# Patient Record
Sex: FEMALE | Race: BLACK OR AFRICAN AMERICAN | URBAN - METROPOLITAN AREA
[De-identification: names, ages, dates, MRNs, and addresses within clinical notes are randomized per-mention and may not be internally consistent; named-entity substitution may affect disease eponyms.]

---

## 2021-08-06 ENCOUNTER — ANESTHESIA EVENT (OUTPATIENT)
Dept: SURGERY | Facility: HOSPITAL | Age: 36
End: 2021-08-06
Payer: MEDICAID

## 2021-08-06 ENCOUNTER — APPOINTMENT (OUTPATIENT)
Dept: GENERAL RADIOLOGY | Facility: HOSPITAL | Age: 36
End: 2021-08-06
Attending: EMERGENCY MEDICINE
Payer: MEDICAID

## 2021-08-06 ENCOUNTER — HOSPITAL ENCOUNTER (OUTPATIENT)
Facility: HOSPITAL | Age: 36
Setting detail: HOSPITAL OUTPATIENT SURGERY
Discharge: HOME | End: 2021-08-06
Attending: EMERGENCY MEDICINE | Admitting: COLON & RECTAL SURGERY
Payer: MEDICAID

## 2021-08-06 ENCOUNTER — ANESTHESIA (OUTPATIENT)
Dept: SURGERY | Facility: HOSPITAL | Age: 36
End: 2021-08-06
Payer: MEDICAID

## 2021-08-06 ENCOUNTER — APPOINTMENT (OUTPATIENT)
Dept: CT IMAGING | Facility: HOSPITAL | Age: 36
End: 2021-08-06
Attending: EMERGENCY MEDICINE
Payer: MEDICAID

## 2021-08-06 VITALS
WEIGHT: 280 LBS | HEART RATE: 106 BPM | DIASTOLIC BLOOD PRESSURE: 78 MMHG | TEMPERATURE: 98 F | OXYGEN SATURATION: 92 % | SYSTOLIC BLOOD PRESSURE: 144 MMHG | BODY MASS INDEX: 54.97 KG/M2 | HEIGHT: 60 IN | RESPIRATION RATE: 18 BRPM

## 2021-08-06 DIAGNOSIS — K37 APPENDICITIS: ICD-10-CM

## 2021-08-06 DIAGNOSIS — K35.30 ACUTE APPENDICITIS WITH LOCALIZED PERITONITIS, WITHOUT PERFORATION, ABSCESS, OR GANGRENE: Primary | ICD-10-CM

## 2021-08-06 PROBLEM — E87.1 HYPONATREMIA: Status: ACTIVE | Noted: 2021-08-06

## 2021-08-06 LAB
ALBUMIN SERPL-MCNC: 3.2 G/DL (ref 3.4–5)
ALBUMIN/GLOB SERPL: 0.6 {RATIO} (ref 1–2)
ALP LIVER SERPL-CCNC: 50 U/L
ALT SERPL-CCNC: 30 U/L
ANION GAP SERPL CALC-SCNC: 4 MMOL/L (ref 0–18)
AST SERPL-CCNC: 31 U/L (ref 15–37)
B-HCG UR QL: NEGATIVE
BASOPHILS # BLD AUTO: 0.03 X10(3) UL (ref 0–0.2)
BASOPHILS NFR BLD AUTO: 0.3 %
BILIRUB SERPL-MCNC: 0.6 MG/DL (ref 0.1–2)
BILIRUB UR QL STRIP.AUTO: NEGATIVE
BUN BLD-MCNC: 9 MG/DL (ref 7–18)
CALCIUM BLD-MCNC: 9.2 MG/DL (ref 8.5–10.1)
CHLORIDE SERPL-SCNC: 101 MMOL/L (ref 98–112)
CLARITY UR REFRACT.AUTO: CLEAR
CO2 SERPL-SCNC: 28 MMOL/L (ref 21–32)
CREAT BLD-MCNC: 0.87 MG/DL
EOSINOPHIL # BLD AUTO: 0.18 X10(3) UL (ref 0–0.7)
EOSINOPHIL NFR BLD AUTO: 1.7 %
ERYTHROCYTE [DISTWIDTH] IN BLOOD BY AUTOMATED COUNT: 14 %
GLOBULIN PLAS-MCNC: 5.4 G/DL (ref 2.8–4.4)
GLUCOSE BLD-MCNC: 96 MG/DL (ref 70–99)
GLUCOSE UR STRIP.AUTO-MCNC: NEGATIVE MG/DL
HCT VFR BLD AUTO: 40.3 %
HGB BLD-MCNC: 13.4 G/DL
IMM GRANULOCYTES # BLD AUTO: 0.03 X10(3) UL (ref 0–1)
IMM GRANULOCYTES NFR BLD: 0.3 %
KETONES UR STRIP.AUTO-MCNC: NEGATIVE MG/DL
LEUKOCYTE ESTERASE UR QL STRIP.AUTO: NEGATIVE
LYMPHOCYTES # BLD AUTO: 2.29 X10(3) UL (ref 1–4)
LYMPHOCYTES NFR BLD AUTO: 21.2 %
M PROTEIN MFR SERPL ELPH: 8.6 G/DL (ref 6.4–8.2)
MCH RBC QN AUTO: 29.5 PG (ref 26–34)
MCHC RBC AUTO-ENTMCNC: 33.3 G/DL (ref 31–37)
MCV RBC AUTO: 88.6 FL
MONOCYTES # BLD AUTO: 0.84 X10(3) UL (ref 0.1–1)
MONOCYTES NFR BLD AUTO: 7.8 %
NEUTROPHILS # BLD AUTO: 7.45 X10 (3) UL (ref 1.5–7.7)
NEUTROPHILS # BLD AUTO: 7.45 X10(3) UL (ref 1.5–7.7)
NEUTROPHILS NFR BLD AUTO: 68.7 %
NITRITE UR QL STRIP.AUTO: NEGATIVE
OSMOLALITY SERPL CALC.SUM OF ELEC: 275 MOSM/KG (ref 275–295)
PH UR STRIP.AUTO: 6 [PH] (ref 5–8)
PLATELET # BLD AUTO: 218 10(3)UL (ref 150–450)
POTASSIUM SERPL-SCNC: 4.2 MMOL/L (ref 3.5–5.1)
PROT UR STRIP.AUTO-MCNC: NEGATIVE MG/DL
RBC # BLD AUTO: 4.55 X10(6)UL
SARS-COV-2 RNA RESP QL NAA+PROBE: NOT DETECTED
SODIUM SERPL-SCNC: 133 MMOL/L (ref 136–145)
SP GR UR STRIP.AUTO: 1 (ref 1–1.03)
UROBILINOGEN UR STRIP.AUTO-MCNC: <2 MG/DL
WBC # BLD AUTO: 10.8 X10(3) UL (ref 4–11)

## 2021-08-06 PROCEDURE — 99285 EMERGENCY DEPT VISIT HI MDM: CPT

## 2021-08-06 PROCEDURE — 80053 COMPREHEN METABOLIC PANEL: CPT | Performed by: EMERGENCY MEDICINE

## 2021-08-06 PROCEDURE — 74177 CT ABD & PELVIS W/CONTRAST: CPT | Performed by: EMERGENCY MEDICINE

## 2021-08-06 PROCEDURE — 85025 COMPLETE CBC W/AUTO DIFF WBC: CPT | Performed by: EMERGENCY MEDICINE

## 2021-08-06 PROCEDURE — 96374 THER/PROPH/DIAG INJ IV PUSH: CPT

## 2021-08-06 PROCEDURE — 88304 TISSUE EXAM BY PATHOLOGIST: CPT | Performed by: STUDENT IN AN ORGANIZED HEALTH CARE EDUCATION/TRAINING PROGRAM

## 2021-08-06 PROCEDURE — 72220 X-RAY EXAM SACRUM TAILBONE: CPT | Performed by: EMERGENCY MEDICINE

## 2021-08-06 PROCEDURE — 81001 URINALYSIS AUTO W/SCOPE: CPT | Performed by: EMERGENCY MEDICINE

## 2021-08-06 PROCEDURE — 0DTJ4ZZ RESECTION OF APPENDIX, PERCUTANEOUS ENDOSCOPIC APPROACH: ICD-10-PCS | Performed by: STUDENT IN AN ORGANIZED HEALTH CARE EDUCATION/TRAINING PROGRAM

## 2021-08-06 PROCEDURE — 81025 URINE PREGNANCY TEST: CPT

## 2021-08-06 RX ORDER — LIDOCAINE HYDROCHLORIDE 10 MG/ML
INJECTION, SOLUTION EPIDURAL; INFILTRATION; INTRACAUDAL; PERINEURAL AS NEEDED
Status: DISCONTINUED | OUTPATIENT
Start: 2021-08-06 | End: 2021-08-06 | Stop reason: SURG

## 2021-08-06 RX ORDER — KETOROLAC TROMETHAMINE 30 MG/ML
15 INJECTION, SOLUTION INTRAMUSCULAR; INTRAVENOUS ONCE
Status: COMPLETED | OUTPATIENT
Start: 2021-08-06 | End: 2021-08-06

## 2021-08-06 RX ORDER — HYDROMORPHONE HYDROCHLORIDE 1 MG/ML
INJECTION, SOLUTION INTRAMUSCULAR; INTRAVENOUS; SUBCUTANEOUS
Status: COMPLETED
Start: 2021-08-06 | End: 2021-08-06

## 2021-08-06 RX ORDER — MIDAZOLAM HYDROCHLORIDE 1 MG/ML
1 INJECTION INTRAMUSCULAR; INTRAVENOUS EVERY 5 MIN PRN
Status: DISCONTINUED | OUTPATIENT
Start: 2021-08-06 | End: 2021-08-06

## 2021-08-06 RX ORDER — HYDROMORPHONE HYDROCHLORIDE 1 MG/ML
0.5 INJECTION, SOLUTION INTRAMUSCULAR; INTRAVENOUS; SUBCUTANEOUS EVERY 30 MIN PRN
Status: CANCELLED | OUTPATIENT
Start: 2021-08-06 | End: 2021-08-06

## 2021-08-06 RX ORDER — HYDROMORPHONE HYDROCHLORIDE 1 MG/ML
0.4 INJECTION, SOLUTION INTRAMUSCULAR; INTRAVENOUS; SUBCUTANEOUS EVERY 5 MIN PRN
Status: DISCONTINUED | OUTPATIENT
Start: 2021-08-06 | End: 2021-08-06

## 2021-08-06 RX ORDER — ONDANSETRON 2 MG/ML
4 INJECTION INTRAMUSCULAR; INTRAVENOUS EVERY 4 HOURS PRN
Status: CANCELLED | OUTPATIENT
Start: 2021-08-06 | End: 2021-08-06

## 2021-08-06 RX ORDER — SODIUM CHLORIDE, SODIUM LACTATE, POTASSIUM CHLORIDE, CALCIUM CHLORIDE 600; 310; 30; 20 MG/100ML; MG/100ML; MG/100ML; MG/100ML
INJECTION, SOLUTION INTRAVENOUS CONTINUOUS
Status: DISCONTINUED | OUTPATIENT
Start: 2021-08-06 | End: 2021-08-06

## 2021-08-06 RX ORDER — ROCURONIUM BROMIDE 10 MG/ML
INJECTION, SOLUTION INTRAVENOUS AS NEEDED
Status: DISCONTINUED | OUTPATIENT
Start: 2021-08-06 | End: 2021-08-06 | Stop reason: SURG

## 2021-08-06 RX ORDER — CEFOXITIN 2 G/1
INJECTION, POWDER, FOR SOLUTION INTRAVENOUS AS NEEDED
Status: DISCONTINUED | OUTPATIENT
Start: 2021-08-06 | End: 2021-08-06 | Stop reason: SURG

## 2021-08-06 RX ORDER — OXYCODONE HYDROCHLORIDE 5 MG/1
5 TABLET ORAL ONCE
Status: COMPLETED | OUTPATIENT
Start: 2021-08-06 | End: 2021-08-06

## 2021-08-06 RX ORDER — HYDROCHLOROTHIAZIDE 25 MG/1
25 TABLET ORAL DAILY
COMMUNITY

## 2021-08-06 RX ORDER — DEXAMETHASONE SODIUM PHOSPHATE 4 MG/ML
VIAL (ML) INJECTION AS NEEDED
Status: DISCONTINUED | OUTPATIENT
Start: 2021-08-06 | End: 2021-08-06 | Stop reason: SURG

## 2021-08-06 RX ORDER — AMOXICILLIN AND CLAVULANATE POTASSIUM 875; 125 MG/1; MG/1
1 TABLET, FILM COATED ORAL 2 TIMES DAILY
Qty: 10 TABLET | Refills: 0 | Status: SHIPPED | OUTPATIENT
Start: 2021-08-06 | End: 2021-08-11

## 2021-08-06 RX ORDER — KETOROLAC TROMETHAMINE 30 MG/ML
INJECTION, SOLUTION INTRAMUSCULAR; INTRAVENOUS AS NEEDED
Status: DISCONTINUED | OUTPATIENT
Start: 2021-08-06 | End: 2021-08-06 | Stop reason: SURG

## 2021-08-06 RX ORDER — SODIUM CHLORIDE 9 MG/ML
INJECTION, SOLUTION INTRAVENOUS CONTINUOUS
Status: CANCELLED | OUTPATIENT
Start: 2021-08-06 | End: 2021-08-06

## 2021-08-06 RX ORDER — AMLODIPINE BESYLATE 10 MG/1
10 TABLET ORAL DAILY
COMMUNITY

## 2021-08-06 RX ORDER — METOCLOPRAMIDE HYDROCHLORIDE 5 MG/ML
INJECTION INTRAMUSCULAR; INTRAVENOUS AS NEEDED
Status: DISCONTINUED | OUTPATIENT
Start: 2021-08-06 | End: 2021-08-06 | Stop reason: SURG

## 2021-08-06 RX ORDER — LABETALOL HYDROCHLORIDE 5 MG/ML
5 INJECTION, SOLUTION INTRAVENOUS EVERY 5 MIN PRN
Status: DISCONTINUED | OUTPATIENT
Start: 2021-08-06 | End: 2021-08-06

## 2021-08-06 RX ORDER — ONDANSETRON 2 MG/ML
4 INJECTION INTRAMUSCULAR; INTRAVENOUS AS NEEDED
Status: DISCONTINUED | OUTPATIENT
Start: 2021-08-06 | End: 2021-08-06

## 2021-08-06 RX ORDER — ONDANSETRON 2 MG/ML
INJECTION INTRAMUSCULAR; INTRAVENOUS AS NEEDED
Status: DISCONTINUED | OUTPATIENT
Start: 2021-08-06 | End: 2021-08-06 | Stop reason: SURG

## 2021-08-06 RX ORDER — BUPIVACAINE HYDROCHLORIDE AND EPINEPHRINE 2.5; 5 MG/ML; UG/ML
INJECTION, SOLUTION EPIDURAL; INFILTRATION; INTRACAUDAL; PERINEURAL AS NEEDED
Status: DISCONTINUED | OUTPATIENT
Start: 2021-08-06 | End: 2021-08-06 | Stop reason: HOSPADM

## 2021-08-06 RX ORDER — NALOXONE HYDROCHLORIDE 0.4 MG/ML
80 INJECTION, SOLUTION INTRAMUSCULAR; INTRAVENOUS; SUBCUTANEOUS AS NEEDED
Status: DISCONTINUED | OUTPATIENT
Start: 2021-08-06 | End: 2021-08-06

## 2021-08-06 RX ORDER — OXYCODONE HYDROCHLORIDE 5 MG/1
5 TABLET ORAL EVERY 6 HOURS PRN
Qty: 10 TABLET | Refills: 0 | Status: SHIPPED | OUTPATIENT
Start: 2021-08-06 | End: 2021-08-23

## 2021-08-06 RX ORDER — MEPERIDINE HYDROCHLORIDE 25 MG/ML
12.5 INJECTION INTRAMUSCULAR; INTRAVENOUS; SUBCUTANEOUS AS NEEDED
Status: DISCONTINUED | OUTPATIENT
Start: 2021-08-06 | End: 2021-08-06

## 2021-08-06 RX ORDER — HEPARIN SODIUM 5000 [USP'U]/ML
5000 INJECTION, SOLUTION INTRAVENOUS; SUBCUTANEOUS ONCE
Status: DISCONTINUED | OUTPATIENT
Start: 2021-08-06 | End: 2021-08-06

## 2021-08-06 RX ORDER — ACETAMINOPHEN 10 MG/ML
INJECTION, SOLUTION INTRAVENOUS AS NEEDED
Status: DISCONTINUED | OUTPATIENT
Start: 2021-08-06 | End: 2021-08-06 | Stop reason: SURG

## 2021-08-06 RX ORDER — SODIUM CHLORIDE 9 MG/ML
INJECTION, SOLUTION INTRAVENOUS CONTINUOUS PRN
Status: DISCONTINUED | OUTPATIENT
Start: 2021-08-06 | End: 2021-08-06 | Stop reason: SURG

## 2021-08-06 RX ADMIN — DEXAMETHASONE SODIUM PHOSPHATE 8 MG: 4 MG/ML VIAL (ML) INJECTION at 15:42:00

## 2021-08-06 RX ADMIN — ROCURONIUM BROMIDE 50 MG: 10 INJECTION, SOLUTION INTRAVENOUS at 15:40:00

## 2021-08-06 RX ADMIN — KETOROLAC TROMETHAMINE 30 MG: 30 INJECTION, SOLUTION INTRAMUSCULAR; INTRAVENOUS at 16:11:00

## 2021-08-06 RX ADMIN — SODIUM CHLORIDE: 9 INJECTION, SOLUTION INTRAVENOUS at 15:27:00

## 2021-08-06 RX ADMIN — ACETAMINOPHEN 1000 MG: 10 INJECTION, SOLUTION INTRAVENOUS at 16:08:00

## 2021-08-06 RX ADMIN — LIDOCAINE HYDROCHLORIDE 50 MG: 10 INJECTION, SOLUTION EPIDURAL; INFILTRATION; INTRACAUDAL; PERINEURAL at 15:32:00

## 2021-08-06 RX ADMIN — ONDANSETRON 4 MG: 2 INJECTION INTRAMUSCULAR; INTRAVENOUS at 16:11:00

## 2021-08-06 RX ADMIN — METOCLOPRAMIDE HYDROCHLORIDE 10 MG: 5 INJECTION INTRAMUSCULAR; INTRAVENOUS at 15:42:00

## 2021-08-06 RX ADMIN — CEFOXITIN 3 G: 2 INJECTION, POWDER, FOR SOLUTION INTRAVENOUS at 15:37:00

## 2021-08-06 NOTE — OPERATIVE REPORT
General Surgery Operative Note  Ben Ingram Location: OR   CSN [id number] MRN [id number]    1985 Age 29year old   Admission Date 2021 Operation Date 2021   Attending Physician Mel Johnson MD Operating Physician Minnie Ponce DESCRIPTION OF PROCEDURE:   After confirmation of informed consent, the patient was transferred to the operating room under the care of the surgical anesthesia team and placed in the supine position. Left arm was tucked and anesthesia was induced.   A Fo transected. The appendix was then ligating using Endo SHAYY stapler with a blue load staples. The appendix was placed into an Endo Catch bag and removed from the abdomen and sent off the sterile field pending final pathology.   The abdomen was then inspecte

## 2021-08-06 NOTE — ANESTHESIA POSTPROCEDURE EVALUATION
Brittany Patient Status:  Inpatient   Age/Gender 29year old female MRN [id number]   [facility] SURGERY Attending Quoc Rao MD   Hosp Day # 0 PCP JENNIFER DURON       Anesthesia Post-op Note    LAPAROSCOPIC

## 2021-08-06 NOTE — H&P
[facility]  Report of  Surgical Consultation with History and Physical Exam    Eros Curtis Patient Status:  Emergency    1985 MRN [id number]   Location [address] [facility] Attending No att. providers found   [facility] Day # 0 PCP facility-administered medications for this encounter. Review of Systems:    Allergic/Immuno:  Review of patient's allergies performed.   Cardiovascular:  Negative for cool extremity and irregular heartbeat/palpitations  Constitutional:  Negative for decr reducible umbilical hernia. Extremities:  No lower extremity edema noted. Without clubbing or cyanosis. Skin: Normal texture and turgor.       Laboratory Data and Relevant X-rays:  Recent Labs   Lab 08/06/21  1130   RBC 4.55   HGB 13.4   HCT 40.3 Problem List:     Hyponatremia     Acute appendicitis with localized peritonitis, without perforation, abscess, or gangrene      Pooja Brown is a 36-year-old female with a history of obesity and hypertension who presented to the emergency department with Small reducible umbilical hernia, transverse Pfannenstiel incision well-healed   Extrem: No C/C/E        A/P  29year old female with acute uncomplicated appendicitis     5. Agree with above, plan for laparoscopic appendectomy possible open  6.  Discussed

## 2021-08-06 NOTE — ANESTHESIA PROCEDURE NOTES
Airway  Date/Time: 8/6/2021 3:34 PM  Urgency: elective      General Information and Staff    Patient location during procedure: OR  Anesthesiologist: Josh Singletary MD  Resident/CRNA: Yarely Wells CRNA  Performed: CRNA     Indications and Patient Conditio

## 2021-08-06 NOTE — ED INITIAL ASSESSMENT (HPI)
Patient presents with c/o right pelvic pain for about 24 hours. Pain radiates towards the middle of her pelvis at times. No urinary symptoms.  She also reports pain in her tailbone which has been ongoing since a fall while roller skating several months back

## 2021-08-06 NOTE — ANESTHESIA PREPROCEDURE EVALUATION
PRE-OP EVALUATION    Patient Name: Jaiden Romero    Admit Diagnosis: No admission diagnoses are documented for this encounter.     Pre-op Diagnosis: Appendicitis [K37]    LAPAROSCOPIC APPENDECTOMY    Anesthesia Procedure: LAPAROSCOPIC APPENDECTOMY (N/A Abdo Patient admitted to room 227 from ED. Patient oriented to room, call light, bed rails, phone, lights and CHG bathed. 4 eyed skin assessment done. Bed locked, in lowest position, side rails up 2/4, call light within reach. Will continue to monitor. Date     (L) 08/06/2021    K 4.2 08/06/2021     08/06/2021    CO2 28.0 08/06/2021    BUN 9 08/06/2021    CREATSERUM 0.87 08/06/2021    GLU 96 08/06/2021    CA 9.2 08/06/2021            Airway      Mallampati: II  Mouth opening: >3 FB  TM distan

## 2021-08-06 NOTE — ED PROVIDER NOTES
Patient Seen in: [facility] Emergency Department      History   Patient presents with:  Abdomen/Flank Pain    Stated Complaint: abdominal pain and tail bone pain following fall    HPI/Subjective:   HPI    36-year-old female who presents to the emerg rhonchi, or rales appreciated. No accessory muscle use noted for breathing. Cardiac: Regular rate and rhythm.   Normal S1 and 2 without murmurs or ectopy appreciated  Abdomen: Soft on examination with mild discomfort noted in the lower pelvis and right lo FINDINGS:     BONES:  Normal.  No significant arthropathy or acute abnormality. SOFT TISSUES:  Negative.  No visible soft tissue swelling. EFFUSION:   None visible.    OTHER:  Negative.                       Impression  CONCLUSION:  Negative exam.  No nondistended.  Calcified phleboliths within the pelvis.  No free pelvic fluid.     AORTA/VASCULAR: Cheryn Clutter is normal in caliber. BONES:  No acute fractures.                       Impression  CONCLUSION:     1.  Acute appendicitis without evidence of absces Prescribed:  Current Discharge Medication List

## 2021-08-10 ENCOUNTER — TELEPHONE (OUTPATIENT)
Dept: SURGERY | Facility: CLINIC | Age: 36
End: 2021-08-10

## 2021-08-10 RX ORDER — OXYCODONE HYDROCHLORIDE 5 MG/1
5 TABLET ORAL EVERY 8 HOURS PRN
Qty: 5 TABLET | Refills: 0 | Status: SHIPPED | OUTPATIENT
Start: 2021-08-10

## 2021-08-16 ENCOUNTER — OFFICE VISIT (OUTPATIENT)
Dept: SURGERY | Facility: CLINIC | Age: 36
End: 2021-08-16
Payer: MEDICAID

## 2021-08-16 VITALS
BODY MASS INDEX: 54.97 KG/M2 | TEMPERATURE: 99 F | HEIGHT: 60 IN | WEIGHT: 280 LBS | SYSTOLIC BLOOD PRESSURE: 132 MMHG | HEART RATE: 75 BPM | DIASTOLIC BLOOD PRESSURE: 96 MMHG

## 2021-08-16 DIAGNOSIS — Z48.89 ENCOUNTER FOR POST SURGICAL WOUND CHECK: ICD-10-CM

## 2021-08-16 DIAGNOSIS — Z09 POSTOP CHECK: Primary | ICD-10-CM

## 2021-08-16 PROBLEM — E87.1 HYPONATREMIA: Status: RESOLVED | Noted: 2021-08-06 | Resolved: 2021-08-16

## 2021-08-16 PROCEDURE — 99024 POSTOP FOLLOW-UP VISIT: CPT | Performed by: STUDENT IN AN ORGANIZED HEALTH CARE EDUCATION/TRAINING PROGRAM

## 2021-08-16 PROCEDURE — 3075F SYST BP GE 130 - 139MM HG: CPT | Performed by: STUDENT IN AN ORGANIZED HEALTH CARE EDUCATION/TRAINING PROGRAM

## 2021-08-16 PROCEDURE — 3080F DIAST BP >= 90 MM HG: CPT | Performed by: STUDENT IN AN ORGANIZED HEALTH CARE EDUCATION/TRAINING PROGRAM

## 2021-08-16 PROCEDURE — 3008F BODY MASS INDEX DOCD: CPT | Performed by: STUDENT IN AN ORGANIZED HEALTH CARE EDUCATION/TRAINING PROGRAM

## 2021-08-16 NOTE — PATIENT INSTRUCTIONS
May take Tylenol or ibuprofen as needed for pain  May remove Steri-Strips and begin water submersion at 2-week post surgery  Continue walking as much as tolerated  No running, jumping, heavy lifting until 6 weeks after surgery  May return to work as tolera

## 2021-08-16 NOTE — PROGRESS NOTES
Post Operative Visit Note       Active Problems  1. Postop check    2. Encounter for post surgical wound check         Chief Complaint   Patient presents with:  Appendix Problem: PO - 8/6 LAPAROSCOPIC APPENDECTOMY (req.  Dr. Tripathi) -- Pt states she is still use: Never    Other Topics      Concerns:       Current Outpatient Medications:   •  hydrochlorothiazide 25 MG Oral Tab, Take 25 mg by mouth daily. , Disp: , Rfl:   •  amLODIPine besylate 10 MG Oral Tab, Take 10 mg by mouth daily. , Disp: , Rfl:   •  oxyCODO to improve.     Dr. Luis GTZ) [facility] General Surgery  8/16/2021  10:21 AM

## 2021-08-23 ENCOUNTER — OFFICE VISIT (OUTPATIENT)
Dept: SURGERY | Facility: CLINIC | Age: 36
End: 2021-08-23
Payer: MEDICAID

## 2021-08-23 VITALS — DIASTOLIC BLOOD PRESSURE: 86 MMHG | HEART RATE: 82 BPM | TEMPERATURE: 98 F | SYSTOLIC BLOOD PRESSURE: 140 MMHG

## 2021-08-23 DIAGNOSIS — Z48.89 ENCOUNTER FOR POST SURGICAL WOUND CHECK: Primary | ICD-10-CM

## 2021-08-23 PROCEDURE — 3077F SYST BP >= 140 MM HG: CPT | Performed by: STUDENT IN AN ORGANIZED HEALTH CARE EDUCATION/TRAINING PROGRAM

## 2021-08-23 PROCEDURE — 3079F DIAST BP 80-89 MM HG: CPT | Performed by: STUDENT IN AN ORGANIZED HEALTH CARE EDUCATION/TRAINING PROGRAM

## 2021-08-23 PROCEDURE — 99024 POSTOP FOLLOW-UP VISIT: CPT | Performed by: STUDENT IN AN ORGANIZED HEALTH CARE EDUCATION/TRAINING PROGRAM

## 2021-08-23 NOTE — PROGRESS NOTES
Post Operative Visit Note       Active Problems  1. Encounter for post surgical wound check         Chief Complaint   Patient presents with:  Appendix Problem: 8/6 lap appey - States went back to work and thinks pt \"over did it\". Pain about 7/10.  States mouth daily. , Disp: , Rfl:   •  oxyCODONE 5 MG Oral Tab, Take 1 tablet (5 mg total) by mouth every 8 (eight) hours as needed for Pain.  (Patient not taking: Reported on 8/16/2021 ), Disp: 5 tablet, Rfl: 0      Review of Systems  A 10 point Review of Systems

## 2023-01-24 ENCOUNTER — HOSPITAL ENCOUNTER (EMERGENCY)
Facility: HOSPITAL | Age: 38
Discharge: HOME | End: 2023-01-25
Attending: EMERGENCY MEDICINE
Payer: MEDICAID

## 2023-01-24 ENCOUNTER — APPOINTMENT (OUTPATIENT)
Dept: GENERAL RADIOLOGY | Facility: HOSPITAL | Age: 38
End: 2023-01-24
Payer: MEDICAID

## 2023-01-24 DIAGNOSIS — J98.01 BRONCHOSPASM: ICD-10-CM

## 2023-01-24 DIAGNOSIS — J20.8 VIRAL BRONCHITIS: Primary | ICD-10-CM

## 2023-01-24 DIAGNOSIS — R06.02 SHORTNESS OF BREATH: ICD-10-CM

## 2023-01-24 LAB
ALBUMIN SERPL-MCNC: 3.3 G/DL (ref 3.4–5)
ALBUMIN/GLOB SERPL: 0.8 {RATIO} (ref 1–2)
ALP LIVER SERPL-CCNC: 44 U/L
ALT SERPL-CCNC: 29 U/L
ANION GAP SERPL CALC-SCNC: 6 MMOL/L (ref 0–18)
AST SERPL-CCNC: 21 U/L (ref 15–37)
BASOPHILS # BLD AUTO: 0.02 X10(3) UL (ref 0–0.2)
BASOPHILS NFR BLD AUTO: 0.2 %
BILIRUB SERPL-MCNC: 0.3 MG/DL (ref 0.1–2)
BUN BLD-MCNC: 13 MG/DL (ref 7–18)
CALCIUM BLD-MCNC: 9.2 MG/DL (ref 8.5–10.1)
CHLORIDE SERPL-SCNC: 105 MMOL/L (ref 98–112)
CO2 SERPL-SCNC: 26 MMOL/L (ref 21–32)
CREAT BLD-MCNC: 0.83 MG/DL
EOSINOPHIL # BLD AUTO: 0.11 X10(3) UL (ref 0–0.7)
EOSINOPHIL NFR BLD AUTO: 1.3 %
ERYTHROCYTE [DISTWIDTH] IN BLOOD BY AUTOMATED COUNT: 14.2 %
GFR SERPLBLD BASED ON 1.73 SQ M-ARVRAT: 94 ML/MIN/1.73M2 (ref 60–?)
GLOBULIN PLAS-MCNC: 4.1 G/DL (ref 2.8–4.4)
GLUCOSE BLD-MCNC: 93 MG/DL (ref 70–99)
HCT VFR BLD AUTO: 37.5 %
HGB BLD-MCNC: 12 G/DL
IMM GRANULOCYTES # BLD AUTO: 0.03 X10(3) UL (ref 0–1)
IMM GRANULOCYTES NFR BLD: 0.3 %
LYMPHOCYTES # BLD AUTO: 3.2 X10(3) UL (ref 1–4)
LYMPHOCYTES NFR BLD AUTO: 36.8 %
MCH RBC QN AUTO: 28.2 PG (ref 26–34)
MCHC RBC AUTO-ENTMCNC: 32 G/DL (ref 31–37)
MCV RBC AUTO: 88 FL
MONOCYTES # BLD AUTO: 0.73 X10(3) UL (ref 0.1–1)
MONOCYTES NFR BLD AUTO: 8.4 %
NEUTROPHILS # BLD AUTO: 4.6 X10 (3) UL (ref 1.5–7.7)
NEUTROPHILS # BLD AUTO: 4.6 X10(3) UL (ref 1.5–7.7)
NEUTROPHILS NFR BLD AUTO: 53 %
OSMOLALITY SERPL CALC.SUM OF ELEC: 284 MOSM/KG (ref 275–295)
PLATELET # BLD AUTO: 271 10(3)UL (ref 150–450)
POTASSIUM SERPL-SCNC: 3.5 MMOL/L (ref 3.5–5.1)
PROT SERPL-MCNC: 7.4 G/DL (ref 6.4–8.2)
RBC # BLD AUTO: 4.26 X10(6)UL
SODIUM SERPL-SCNC: 137 MMOL/L (ref 136–145)
TROPONIN I HIGH SENSITIVITY: 25 NG/L
WBC # BLD AUTO: 8.7 X10(3) UL (ref 4–11)

## 2023-01-24 PROCEDURE — 84484 ASSAY OF TROPONIN QUANT: CPT

## 2023-01-24 PROCEDURE — 85025 COMPLETE CBC W/AUTO DIFF WBC: CPT

## 2023-01-24 PROCEDURE — 93010 ELECTROCARDIOGRAM REPORT: CPT

## 2023-01-24 PROCEDURE — 80053 COMPREHEN METABOLIC PANEL: CPT | Performed by: EMERGENCY MEDICINE

## 2023-01-24 PROCEDURE — 80053 COMPREHEN METABOLIC PANEL: CPT

## 2023-01-24 PROCEDURE — 71045 X-RAY EXAM CHEST 1 VIEW: CPT

## 2023-01-24 PROCEDURE — 36415 COLL VENOUS BLD VENIPUNCTURE: CPT

## 2023-01-24 PROCEDURE — 85025 COMPLETE CBC W/AUTO DIFF WBC: CPT | Performed by: EMERGENCY MEDICINE

## 2023-01-24 PROCEDURE — 84484 ASSAY OF TROPONIN QUANT: CPT | Performed by: EMERGENCY MEDICINE

## 2023-01-24 PROCEDURE — 99285 EMERGENCY DEPT VISIT HI MDM: CPT

## 2023-01-24 PROCEDURE — 99284 EMERGENCY DEPT VISIT MOD MDM: CPT

## 2023-01-24 PROCEDURE — 93005 ELECTROCARDIOGRAM TRACING: CPT

## 2023-01-25 VITALS
WEIGHT: 293 LBS | BODY MASS INDEX: 57.52 KG/M2 | OXYGEN SATURATION: 97 % | HEART RATE: 77 BPM | TEMPERATURE: 98 F | DIASTOLIC BLOOD PRESSURE: 84 MMHG | HEIGHT: 60 IN | RESPIRATION RATE: 20 BRPM | SYSTOLIC BLOOD PRESSURE: 136 MMHG

## 2023-01-25 LAB
ATRIAL RATE: 90 BPM
B-HCG UR QL: NEGATIVE
FLUAV + FLUBV RNA SPEC NAA+PROBE: NEGATIVE
FLUAV + FLUBV RNA SPEC NAA+PROBE: NEGATIVE
P AXIS: 66 DEGREES
P-R INTERVAL: 200 MS
Q-T INTERVAL: 366 MS
QRS DURATION: 92 MS
QTC CALCULATION (BEZET): 447 MS
R AXIS: 43 DEGREES
RSV RNA SPEC NAA+PROBE: NEGATIVE
SARS-COV-2 RNA RESP QL NAA+PROBE: NOT DETECTED
T AXIS: 68 DEGREES
VENTRICULAR RATE: 90 BPM

## 2023-01-25 PROCEDURE — 0241U SARS-COV-2/FLU A AND B/RSV BY PCR (GENEXPERT): CPT | Performed by: EMERGENCY MEDICINE

## 2023-01-25 PROCEDURE — 81025 URINE PREGNANCY TEST: CPT

## 2023-01-25 RX ORDER — PREDNISONE 50 MG/1
50 TABLET ORAL DAILY
Qty: 5 TABLET | Refills: 0 | Status: SHIPPED | OUTPATIENT
Start: 2023-01-25

## 2023-01-25 RX ORDER — PREDNISONE 20 MG/1
60 TABLET ORAL ONCE
Status: COMPLETED | OUTPATIENT
Start: 2023-01-25 | End: 2023-01-25

## 2023-01-25 RX ORDER — ALBUTEROL SULFATE 90 UG/1
2 AEROSOL, METERED RESPIRATORY (INHALATION) EVERY 4 HOURS PRN
Qty: 1 EACH | Refills: 0 | Status: SHIPPED | OUTPATIENT
Start: 2023-01-25 | End: 2023-02-24

## 2023-01-25 RX ORDER — BENZONATATE 200 MG/1
200 CAPSULE ORAL 3 TIMES DAILY PRN
Qty: 30 CAPSULE | Refills: 0 | Status: SHIPPED | OUTPATIENT
Start: 2023-01-25 | End: 2023-02-24

## 2023-01-25 RX ORDER — CODEINE PHOSPHATE AND GUAIFENESIN 10; 100 MG/5ML; MG/5ML
10 SOLUTION ORAL EVERY 6 HOURS PRN
Qty: 200 ML | Refills: 0 | Status: SHIPPED | OUTPATIENT
Start: 2023-01-25

## 2023-05-02 ENCOUNTER — APPOINTMENT (OUTPATIENT)
Dept: GENERAL RADIOLOGY | Facility: HOSPITAL | Age: 38
End: 2023-05-02
Attending: EMERGENCY MEDICINE
Payer: COMMERCIAL

## 2023-05-02 ENCOUNTER — HOSPITAL ENCOUNTER (EMERGENCY)
Facility: HOSPITAL | Age: 38
Discharge: HOME | End: 2023-05-03
Attending: EMERGENCY MEDICINE
Payer: COMMERCIAL

## 2023-05-02 DIAGNOSIS — S20.219A CONTUSION OF CHEST WALL, UNSPECIFIED LATERALITY, INITIAL ENCOUNTER: Primary | ICD-10-CM
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
## 2023-05-02 PROCEDURE — 71046 X-RAY EXAM CHEST 2 VIEWS: CPT | Performed by: EMERGENCY MEDICINE

## 2023-05-02 PROCEDURE — 93005 ELECTROCARDIOGRAM TRACING: CPT

## 2023-05-02 PROCEDURE — 99284 EMERGENCY DEPT VISIT MOD MDM: CPT

## 2023-05-02 PROCEDURE — 93010 ELECTROCARDIOGRAM REPORT: CPT

## 2023-05-02 PROCEDURE — 99283 EMERGENCY DEPT VISIT LOW MDM: CPT

## 2023-05-03 VITALS
RESPIRATION RATE: 23 BRPM | OXYGEN SATURATION: 96 % | TEMPERATURE: 99 F | HEART RATE: 89 BPM | DIASTOLIC BLOOD PRESSURE: 93 MMHG | SYSTOLIC BLOOD PRESSURE: 150 MMHG

## 2023-05-03 LAB
ATRIAL RATE: 74 BPM
P AXIS: 55 DEGREES
P-R INTERVAL: 204 MS
Q-T INTERVAL: 408 MS
QRS DURATION: 92 MS
QTC CALCULATION (BEZET): 452 MS
R AXIS: 30 DEGREES
T AXIS: 45 DEGREES
VENTRICULAR RATE: 74 BPM
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
## 2023-12-23 ENCOUNTER — APPOINTMENT (OUTPATIENT)
Dept: CT IMAGING | Facility: HOSPITAL | Age: 38
End: 2023-12-23
Attending: EMERGENCY MEDICINE
Payer: COMMERCIAL

## 2023-12-23 ENCOUNTER — HOSPITAL ENCOUNTER (EMERGENCY)
Facility: HOSPITAL | Age: 38
Discharge: HOME | End: 2023-12-23
Attending: EMERGENCY MEDICINE
Payer: COMMERCIAL

## 2023-12-23 ENCOUNTER — APPOINTMENT (OUTPATIENT)
Dept: GENERAL RADIOLOGY | Facility: HOSPITAL | Age: 38
End: 2023-12-23
Attending: EMERGENCY MEDICINE
Payer: COMMERCIAL

## 2023-12-23 VITALS
TEMPERATURE: 99 F | DIASTOLIC BLOOD PRESSURE: 87 MMHG | SYSTOLIC BLOOD PRESSURE: 168 MMHG | RESPIRATION RATE: 26 BRPM | WEIGHT: 293 LBS | HEIGHT: 62 IN | OXYGEN SATURATION: 94 % | HEART RATE: 78 BPM | BODY MASS INDEX: 53.92 KG/M2

## 2023-12-23 DIAGNOSIS — S39.012A STRAIN OF LUMBAR REGION, INITIAL ENCOUNTER: ICD-10-CM

## 2023-12-23 DIAGNOSIS — S16.1XXA STRAIN OF NECK MUSCLE, INITIAL ENCOUNTER: Primary | ICD-10-CM
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
## 2023-12-23 PROCEDURE — 72110 X-RAY EXAM L-2 SPINE 4/>VWS: CPT | Performed by: EMERGENCY MEDICINE

## 2023-12-23 PROCEDURE — 99284 EMERGENCY DEPT VISIT MOD MDM: CPT

## 2023-12-23 PROCEDURE — 71111 X-RAY EXAM RIBS/CHEST4/> VWS: CPT | Performed by: EMERGENCY MEDICINE

## 2023-12-23 PROCEDURE — 72125 CT NECK SPINE W/O DYE: CPT | Performed by: EMERGENCY MEDICINE

## 2023-12-23 PROCEDURE — 70450 CT HEAD/BRAIN W/O DYE: CPT | Performed by: EMERGENCY MEDICINE

## 2023-12-23 RX ORDER — AMLODIPINE BESYLATE 5 MG/1
10 TABLET ORAL ONCE
Status: COMPLETED | OUTPATIENT
Start: 2023-12-23 | End: 2023-12-23

## 2023-12-23 RX ORDER — IBUPROFEN 600 MG/1
600 TABLET ORAL ONCE
Status: COMPLETED | OUTPATIENT
Start: 2023-12-23 | End: 2023-12-23

## 2023-12-23 RX ORDER — CYCLOBENZAPRINE HCL 10 MG
10 TABLET ORAL ONCE
Status: COMPLETED | OUTPATIENT
Start: 2023-12-23 | End: 2023-12-23

## 2023-12-23 RX ORDER — ACETAMINOPHEN 500 MG
1000 TABLET ORAL ONCE
Status: COMPLETED | OUTPATIENT
Start: 2023-12-23 | End: 2023-12-23

## 2023-12-23 RX ORDER — AMLODIPINE BESYLATE 5 MG/1
10 TABLET ORAL ONCE
Status: DISCONTINUED | OUTPATIENT
Start: 2023-12-24 | End: 2023-12-23

## 2023-12-23 RX ORDER — CYCLOBENZAPRINE HCL 10 MG
10 TABLET ORAL 3 TIMES DAILY PRN
Qty: 10 TABLET | Refills: 0 | Status: SHIPPED | OUTPATIENT
Start: 2023-12-23 | End: 2023-12-30
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
## 2023-12-24 NOTE — DISCHARGE INSTRUCTIONS
Follow-up with your primary care doctor within the next week for reassessment. Take the medication as prescribed. It can make you drowsy, so be careful when taking it. Return for any new or worsening pain or other concerning symptoms.

## 2024-07-04 ENCOUNTER — APPOINTMENT (OUTPATIENT)
Dept: ULTRASOUND IMAGING | Facility: HOSPITAL | Age: 39
End: 2024-07-04
Attending: EMERGENCY MEDICINE
Payer: MEDICAID

## 2024-07-04 ENCOUNTER — HOSPITAL ENCOUNTER (INPATIENT)
Facility: HOSPITAL | Age: 39
LOS: 2 days | Discharge: HOME | End: 2024-07-06
Attending: EMERGENCY MEDICINE | Admitting: HOSPITALIST
Payer: MEDICAID

## 2024-07-04 DIAGNOSIS — K81.9 CHOLECYSTITIS: ICD-10-CM

## 2024-07-04 DIAGNOSIS — G89.18 POSTOPERATIVE PAIN: ICD-10-CM

## 2024-07-04 DIAGNOSIS — K81.0 ACUTE CHOLECYSTITIS: Primary | ICD-10-CM

## 2024-07-04 PROBLEM — E87.1 HYPONATREMIA: Status: ACTIVE | Noted: 2024-07-04

## 2024-07-04 PROBLEM — R73.9 HYPERGLYCEMIA: Status: ACTIVE | Noted: 2024-07-04

## 2024-07-04 PROBLEM — D64.9 ANEMIA: Status: ACTIVE | Noted: 2024-07-04

## 2024-07-04 LAB
ALBUMIN SERPL-MCNC: 3.5 G/DL (ref 3.4–5)
ALBUMIN/GLOB SERPL: 0.7 {RATIO} (ref 1–2)
ALP LIVER SERPL-CCNC: 52 U/L
ALT SERPL-CCNC: 21 U/L
ANION GAP SERPL CALC-SCNC: 4 MMOL/L (ref 0–18)
AST SERPL-CCNC: 11 U/L (ref 15–37)
B-HCG UR QL: NEGATIVE
BASOPHILS # BLD AUTO: 0.03 X10(3) UL (ref 0–0.2)
BASOPHILS NFR BLD AUTO: 0.4 %
BILIRUB SERPL-MCNC: 0.3 MG/DL (ref 0.1–2)
BILIRUB UR QL STRIP.AUTO: NEGATIVE
BUN BLD-MCNC: 9 MG/DL (ref 9–23)
CALCIUM BLD-MCNC: 9.6 MG/DL (ref 8.5–10.1)
CHLORIDE SERPL-SCNC: 99 MMOL/L (ref 98–112)
CLARITY UR REFRACT.AUTO: CLEAR
CO2 SERPL-SCNC: 30 MMOL/L (ref 21–32)
COLOR UR AUTO: COLORLESS
CREAT BLD-MCNC: 0.83 MG/DL
EGFRCR SERPLBLD CKD-EPI 2021: 92 ML/MIN/1.73M2 (ref 60–?)
EOSINOPHIL # BLD AUTO: 0.13 X10(3) UL (ref 0–0.7)
EOSINOPHIL NFR BLD AUTO: 1.7 %
ERYTHROCYTE [DISTWIDTH] IN BLOOD BY AUTOMATED COUNT: 16.4 %
GLOBULIN PLAS-MCNC: 5 G/DL (ref 2.8–4.4)
GLUCOSE BLD-MCNC: 120 MG/DL (ref 70–99)
GLUCOSE UR STRIP.AUTO-MCNC: NORMAL MG/DL
HCT VFR BLD AUTO: 36.1 %
HGB BLD-MCNC: 11.4 G/DL
IMM GRANULOCYTES # BLD AUTO: 0.02 X10(3) UL (ref 0–1)
IMM GRANULOCYTES NFR BLD: 0.3 %
KETONES UR STRIP.AUTO-MCNC: NEGATIVE MG/DL
LEUKOCYTE ESTERASE UR QL STRIP.AUTO: NEGATIVE
LIPASE SERPL-CCNC: 24 U/L (ref 13–75)
LYMPHOCYTES # BLD AUTO: 2.2 X10(3) UL (ref 1–4)
LYMPHOCYTES NFR BLD AUTO: 29.4 %
MCH RBC QN AUTO: 24.2 PG (ref 26–34)
MCHC RBC AUTO-ENTMCNC: 31.6 G/DL (ref 31–37)
MCV RBC AUTO: 76.5 FL
MONOCYTES # BLD AUTO: 0.62 X10(3) UL (ref 0.1–1)
MONOCYTES NFR BLD AUTO: 8.3 %
NEUTROPHILS # BLD AUTO: 4.49 X10 (3) UL (ref 1.5–7.7)
NEUTROPHILS # BLD AUTO: 4.49 X10(3) UL (ref 1.5–7.7)
NEUTROPHILS NFR BLD AUTO: 59.9 %
NITRITE UR QL STRIP.AUTO: NEGATIVE
OSMOLALITY SERPL CALC.SUM OF ELEC: 276 MOSM/KG (ref 275–295)
PH UR STRIP.AUTO: 7 [PH] (ref 5–8)
PLATELET # BLD AUTO: 380 10(3)UL (ref 150–450)
POTASSIUM SERPL-SCNC: 3.5 MMOL/L (ref 3.5–5.1)
PROT SERPL-MCNC: 8.5 G/DL (ref 6.4–8.2)
PROT UR STRIP.AUTO-MCNC: NEGATIVE MG/DL
RBC # BLD AUTO: 4.72 X10(6)UL
RBC UR QL AUTO: NEGATIVE
SODIUM SERPL-SCNC: 133 MMOL/L (ref 136–145)
SP GR UR STRIP.AUTO: 1.01 (ref 1–1.03)
UROBILINOGEN UR STRIP.AUTO-MCNC: NORMAL MG/DL
WBC # BLD AUTO: 7.5 X10(3) UL (ref 4–11)

## 2024-07-04 PROCEDURE — 99223 1ST HOSP IP/OBS HIGH 75: CPT | Performed by: STUDENT IN AN ORGANIZED HEALTH CARE EDUCATION/TRAINING PROGRAM

## 2024-07-04 PROCEDURE — 99223 1ST HOSP IP/OBS HIGH 75: CPT | Performed by: HOSPITALIST

## 2024-07-04 PROCEDURE — 47563 LAPARO CHOLECYSTECTOMY/GRAPH: CPT

## 2024-07-04 PROCEDURE — 76700 US EXAM ABDOM COMPLETE: CPT | Performed by: EMERGENCY MEDICINE

## 2024-07-04 RX ORDER — CALCIUM CARBONATE 500 MG/1
500 TABLET, CHEWABLE ORAL 3 TIMES DAILY PRN
Status: DISCONTINUED | OUTPATIENT
Start: 2024-07-04 | End: 2024-07-06

## 2024-07-04 RX ORDER — SENNOSIDES 8.6 MG
17.2 TABLET ORAL NIGHTLY PRN
Status: DISCONTINUED | OUTPATIENT
Start: 2024-07-04 | End: 2024-07-06

## 2024-07-04 RX ORDER — POLYETHYLENE GLYCOL 3350 17 G/17G
17 POWDER, FOR SOLUTION ORAL DAILY PRN
Status: DISCONTINUED | OUTPATIENT
Start: 2024-07-04 | End: 2024-07-06

## 2024-07-04 RX ORDER — MELATONIN
3 NIGHTLY PRN
Status: DISCONTINUED | OUTPATIENT
Start: 2024-07-04 | End: 2024-07-06

## 2024-07-04 RX ORDER — SIMETHICONE 80 MG
80 TABLET,CHEWABLE ORAL 4 TIMES DAILY PRN
Status: DISCONTINUED | OUTPATIENT
Start: 2024-07-04 | End: 2024-07-06

## 2024-07-04 RX ORDER — CARVEDILOL 6.25 MG/1
6.25 TABLET ORAL DAILY
COMMUNITY
Start: 2024-03-21 | End: 2024-07-06

## 2024-07-04 RX ORDER — BISACODYL 10 MG
10 SUPPOSITORY, RECTAL RECTAL
Status: DISCONTINUED | OUTPATIENT
Start: 2024-07-04 | End: 2024-07-06

## 2024-07-04 RX ORDER — HYDROMORPHONE HYDROCHLORIDE 1 MG/ML
0.4 INJECTION, SOLUTION INTRAMUSCULAR; INTRAVENOUS; SUBCUTANEOUS EVERY 2 HOUR PRN
Status: DISCONTINUED | OUTPATIENT
Start: 2024-07-04 | End: 2024-07-06

## 2024-07-04 RX ORDER — ACETAMINOPHEN 500 MG
1000 TABLET ORAL EVERY 4 HOURS PRN
Status: DISCONTINUED | OUTPATIENT
Start: 2024-07-04 | End: 2024-07-06

## 2024-07-04 RX ORDER — HYDROCODONE BITARTRATE AND ACETAMINOPHEN 5; 325 MG/1; MG/1
2 TABLET ORAL EVERY 4 HOURS PRN
Status: DISCONTINUED | OUTPATIENT
Start: 2024-07-04 | End: 2024-07-06

## 2024-07-04 RX ORDER — HYDRALAZINE HYDROCHLORIDE 20 MG/ML
10 INJECTION INTRAMUSCULAR; INTRAVENOUS EVERY 6 HOURS PRN
Status: DISCONTINUED | OUTPATIENT
Start: 2024-07-04 | End: 2024-07-06

## 2024-07-04 RX ORDER — HYDROMORPHONE HYDROCHLORIDE 1 MG/ML
0.2 INJECTION, SOLUTION INTRAMUSCULAR; INTRAVENOUS; SUBCUTANEOUS EVERY 2 HOUR PRN
Status: DISCONTINUED | OUTPATIENT
Start: 2024-07-04 | End: 2024-07-06

## 2024-07-04 RX ORDER — DIPHENHYDRAMINE HYDROCHLORIDE 50 MG/ML
12.5 INJECTION INTRAMUSCULAR; INTRAVENOUS EVERY 6 HOURS PRN
Status: DISCONTINUED | OUTPATIENT
Start: 2024-07-04 | End: 2024-07-06

## 2024-07-04 RX ORDER — ENEMA 19; 7 G/133ML; G/133ML
1 ENEMA RECTAL ONCE AS NEEDED
Status: DISCONTINUED | OUTPATIENT
Start: 2024-07-04 | End: 2024-07-06

## 2024-07-04 RX ORDER — LABETALOL HYDROCHLORIDE 5 MG/ML
10 INJECTION, SOLUTION INTRAVENOUS EVERY 4 HOURS PRN
Status: DISCONTINUED | OUTPATIENT
Start: 2024-07-04 | End: 2024-07-06

## 2024-07-04 RX ORDER — HYDROXYZINE HYDROCHLORIDE 10 MG/1
10 TABLET, FILM COATED ORAL 3 TIMES DAILY PRN
Status: DISCONTINUED | OUTPATIENT
Start: 2024-07-04 | End: 2024-07-06

## 2024-07-04 RX ORDER — BENZONATATE 100 MG/1
200 CAPSULE ORAL 3 TIMES DAILY PRN
Status: DISCONTINUED | OUTPATIENT
Start: 2024-07-04 | End: 2024-07-06

## 2024-07-04 RX ORDER — ECHINACEA PURPUREA EXTRACT 125 MG
1 TABLET ORAL
Status: DISCONTINUED | OUTPATIENT
Start: 2024-07-04 | End: 2024-07-06

## 2024-07-04 RX ORDER — ONDANSETRON 2 MG/ML
4 INJECTION INTRAMUSCULAR; INTRAVENOUS EVERY 6 HOURS PRN
Status: DISCONTINUED | OUTPATIENT
Start: 2024-07-04 | End: 2024-07-06

## 2024-07-04 RX ORDER — DEXTROSE, SODIUM CHLORIDE, SODIUM LACTATE, POTASSIUM CHLORIDE, AND CALCIUM CHLORIDE 5; .6; .31; .03; .02 G/100ML; G/100ML; G/100ML; G/100ML; G/100ML
100 INJECTION, SOLUTION INTRAVENOUS CONTINUOUS
Status: DISCONTINUED | OUTPATIENT
Start: 2024-07-04 | End: 2024-07-04

## 2024-07-04 RX ORDER — DEXTROSE, SODIUM CHLORIDE, SODIUM LACTATE, POTASSIUM CHLORIDE, AND CALCIUM CHLORIDE 5; .6; .31; .03; .02 G/100ML; G/100ML; G/100ML; G/100ML; G/100ML
INJECTION, SOLUTION INTRAVENOUS CONTINUOUS
Status: DISCONTINUED | OUTPATIENT
Start: 2024-07-04 | End: 2024-07-05

## 2024-07-04 RX ORDER — HYDROCODONE BITARTRATE AND ACETAMINOPHEN 5; 325 MG/1; MG/1
1 TABLET ORAL EVERY 4 HOURS PRN
Status: DISCONTINUED | OUTPATIENT
Start: 2024-07-04 | End: 2024-07-06

## 2024-07-04 RX ORDER — ACETAMINOPHEN 325 MG/1
650 TABLET ORAL EVERY 4 HOURS PRN
Status: DISCONTINUED | OUTPATIENT
Start: 2024-07-04 | End: 2024-07-06

## 2024-07-04 RX ORDER — AMLODIPINE BESYLATE 5 MG/1
10 TABLET ORAL DAILY
Status: DISCONTINUED | OUTPATIENT
Start: 2024-07-04 | End: 2024-07-06

## 2024-07-04 RX ORDER — HYDROMORPHONE HYDROCHLORIDE 1 MG/ML
1 INJECTION, SOLUTION INTRAMUSCULAR; INTRAVENOUS; SUBCUTANEOUS EVERY 30 MIN PRN
Status: DISCONTINUED | OUTPATIENT
Start: 2024-07-04 | End: 2024-07-06

## 2024-07-04 RX ORDER — PROCHLORPERAZINE EDISYLATE 5 MG/ML
5 INJECTION INTRAMUSCULAR; INTRAVENOUS EVERY 8 HOURS PRN
Status: DISCONTINUED | OUTPATIENT
Start: 2024-07-04 | End: 2024-07-06

## 2024-07-04 RX ORDER — HYDROMORPHONE HYDROCHLORIDE 1 MG/ML
0.8 INJECTION, SOLUTION INTRAMUSCULAR; INTRAVENOUS; SUBCUTANEOUS EVERY 2 HOUR PRN
Status: DISCONTINUED | OUTPATIENT
Start: 2024-07-04 | End: 2024-07-06

## 2024-07-04 RX ORDER — ONDANSETRON 2 MG/ML
4 INJECTION INTRAMUSCULAR; INTRAVENOUS ONCE
Status: COMPLETED | OUTPATIENT
Start: 2024-07-04 | End: 2024-07-04

## 2024-07-04 NOTE — PROGRESS NOTES
A&Ox4. VSS. RA. .  GI: Abdomen soft, nondistended, tender. Passing gas.  Nausea controlled with PRN zofran  : Voids.  Pain controlled with PRN pain medications  Up with standby assist.  Diet: NPO  IVF running per order.  All appropriate safety measures in place. Patient updated on plan of care. All questions and concerns addressed.    Plan: surgery to see

## 2024-07-04 NOTE — ED INITIAL ASSESSMENT (HPI)
Pt c/o 10/10 constant abd pain in the middle of her abd and sharp pain under her R breast which started at 4 am. Pt took tums and laxatives this morning, because thought she was constipated. Pt took her bp meds this am, but vomited immediately after.

## 2024-07-04 NOTE — H&P
[facility]     Cain Boone Patient Status:  Inpatient    1985 MRN [id number]   Location [facility] 3NW-A Attending Dorian Escobar MD   Hosp Day # 0 FRANCES VALENCIA     Chief Complaint:   Chief Complaint   Patient presents with    Abdomen/Flank Pain       Subjective:    History of Present Illness:     Cain Boone is a 39 year old female with a past medical history of HTN.  She comes to the ED due to abdominal pain which started earlier today.  Pain over most of her abdomen but mostly in RUQ.  US done with signs of acute cholecystitis.  BP elevated in ED when pain was high. Better now after IV narcotics.     History/Other:    Past Medical History:  Past Medical History:    Essential hypertension     Past Surgical History:   Past Surgical History:   Procedure Laterality Date    Appendectomy  2021          Tonsillectomy        Family History:   No family history on file.  Social History:    reports that she has never smoked. She has never used smokeless tobacco. She reports current alcohol use. She reports that she does not use drugs.     Allergies: No Known Allergies    Medications:    No current facility-administered medications on file prior to encounter.     Current Outpatient Medications on File Prior to Encounter   Medication Sig Dispense Refill    carvedilol 6.25 MG Oral Tab Take 1 tablet (6.25 mg total) by mouth daily.      hydrochlorothiazide 25 MG Oral Tab Take 1 tablet (25 mg total) by mouth daily.      amLODIPine besylate 10 MG Oral Tab Take 1 tablet (10 mg total) by mouth daily.      guaiFENesin-codeine 100-10 MG/5ML Oral Solution Take 10 mL by mouth every 6 (six) hours as needed for cough. Do not drive or operate machinery within 8 hours of taking this medication 200 mL 0    predniSONE 50 MG Oral Tab Take 1 tablet (50 mg total) by mouth daily. 5 tablet 0    oxyCODONE 5 MG Oral Tab Take 1 tablet (5 mg total) by mouth every 8 (eight) hours as  needed for Pain. (Patient not taking: Reported on 8/16/2021 ) 5 tablet 0       Review of Systems:   A comprehensive review of systems was completed.    Pertinent positives and negatives noted in the HPI.    Objective:   Physical Exam:    /75   Pulse 76   Temp 98 °F (36.7 °C)   Resp 18   Ht 5' (1.524 m)   Wt 298 lb (135.2 kg)   LMP 06/05/2024 (Exact Date)   SpO2 97%   BMI 58.20 kg/m²   General: No acute distress, Alert  Respiratory: No rhonchi, no wheezes  Cardiovascular: S1, S2.   Abdomen: Soft, TTP, non distened  Neuro: No new focal deficits  Extremities: No edema      Results:    Labs:      Labs Last 24 Hours:  Recent Labs   Lab 07/04/24  1034   WBC 7.5   HGB 11.4*   MCV 76.5*   .0       Recent Labs   Lab 07/04/24  1034   *   BUN 9   CREATSERUM 0.83   CA 9.6   ALB 3.5   *   K 3.5   CL 99   CO2 30.0   ALKPHO 52   AST 11*   ALT 21   BILT 0.3   TP 8.5*       Estimated Creatinine Clearance: 66 mL/min (based on SCr of 0.83 mg/dL).    No results for input(s): \"TROP\", \"TROPHS\", \"CK\" in the last 168 hours.    No results for input(s): \"PTP\", \"INR\" in the last 168 hours.    No results for input(s): \"TROP\", \"CK\" in the last 168 hours.      Imaging: Imaging data reviewed in Epic.    Assessment & Plan:      #Acute cholecystitis  US with dilated GB and stone in GB neck  Afebrile, WBC WNL  LFT's not elevated  Eval for cholecystectomy  Pain control  IVF  Bowel rest    #HTN  Elevated with pain  Norvasc  PRN IV meds ordered    #Morbid Obesity  Body mass index is 58.2 kg/m².    DVT PPX: start post op. Lovenox if not going to OR today or tomorrow      All diagnosis' and recommendations discussed with patient and/or family in detail.      Plan of care discussed with ED physician      Dorian Escobar MD    Supplementary Documentation:     The 21st Century Cures Act makes medical notes like these available to patients in the interest of transparency. Please be advised this is a medical document. Medical  documents are intended to carry relevant information, facts as evident, and the clinical opinion of the practitioner. The medical note is intended as peer to peer communication and may appear blunt or direct. It is written in medical language and may contain abbreviations or verbiage that are unfamiliar.

## 2024-07-04 NOTE — ED QUICK NOTES
Orders for admission, patient is aware of plan and ready to go upstairs. Any questions, please call ED RN Sam at extension [phone]     Patient Covid vaccination status: Unvaccinated     COVID Test Ordered in ED: None    COVID Suspicion at Admission: N/A    Running Infusions:  None    Mental Status/LOC at time of transport: Alert    Other pertinent information:   CIWA score: N/A   NIH score:  N/A

## 2024-07-05 ENCOUNTER — ANESTHESIA (OUTPATIENT)
Dept: SURGERY | Facility: HOSPITAL | Age: 39
End: 2024-07-05
Payer: MEDICAID

## 2024-07-05 ENCOUNTER — ANESTHESIA EVENT (OUTPATIENT)
Dept: SURGERY | Facility: HOSPITAL | Age: 39
End: 2024-07-05
Payer: MEDICAID

## 2024-07-05 ENCOUNTER — APPOINTMENT (OUTPATIENT)
Dept: GENERAL RADIOLOGY | Facility: HOSPITAL | Age: 39
End: 2024-07-05
Attending: STUDENT IN AN ORGANIZED HEALTH CARE EDUCATION/TRAINING PROGRAM
Payer: MEDICAID

## 2024-07-05 LAB
ALBUMIN SERPL-MCNC: 3 G/DL (ref 3.4–5)
ALBUMIN/GLOB SERPL: 0.7 {RATIO} (ref 1–2)
ALP LIVER SERPL-CCNC: 45 U/L
ALT SERPL-CCNC: 19 U/L
ANION GAP SERPL CALC-SCNC: 6 MMOL/L (ref 0–18)
AST SERPL-CCNC: 9 U/L (ref 15–37)
ATRIAL RATE: 83 BPM
BASOPHILS # BLD AUTO: 0.03 X10(3) UL (ref 0–0.2)
BASOPHILS NFR BLD AUTO: 0.4 %
BILIRUB SERPL-MCNC: 0.3 MG/DL (ref 0.1–2)
BUN BLD-MCNC: 8 MG/DL (ref 9–23)
CALCIUM BLD-MCNC: 8.9 MG/DL (ref 8.5–10.1)
CHLORIDE SERPL-SCNC: 100 MMOL/L (ref 98–112)
CO2 SERPL-SCNC: 28 MMOL/L (ref 21–32)
CREAT BLD-MCNC: 0.97 MG/DL
EGFRCR SERPLBLD CKD-EPI 2021: 77 ML/MIN/1.73M2 (ref 60–?)
EOSINOPHIL # BLD AUTO: 0.17 X10(3) UL (ref 0–0.7)
EOSINOPHIL NFR BLD AUTO: 2.2 %
ERYTHROCYTE [DISTWIDTH] IN BLOOD BY AUTOMATED COUNT: 16.3 %
GLOBULIN PLAS-MCNC: 4.4 G/DL (ref 2.8–4.4)
GLUCOSE BLD-MCNC: 109 MG/DL (ref 70–99)
HCT VFR BLD AUTO: 34.3 %
HGB BLD-MCNC: 10.5 G/DL
IMM GRANULOCYTES # BLD AUTO: 0.02 X10(3) UL (ref 0–1)
IMM GRANULOCYTES NFR BLD: 0.3 %
LYMPHOCYTES # BLD AUTO: 3.06 X10(3) UL (ref 1–4)
LYMPHOCYTES NFR BLD AUTO: 39.1 %
MAGNESIUM SERPL-MCNC: 2.5 MG/DL (ref 1.6–2.6)
MCH RBC QN AUTO: 23.7 PG (ref 26–34)
MCHC RBC AUTO-ENTMCNC: 30.6 G/DL (ref 31–37)
MCV RBC AUTO: 77.4 FL
MONOCYTES # BLD AUTO: 0.75 X10(3) UL (ref 0.1–1)
MONOCYTES NFR BLD AUTO: 9.6 %
NEUTROPHILS # BLD AUTO: 3.79 X10 (3) UL (ref 1.5–7.7)
NEUTROPHILS # BLD AUTO: 3.79 X10(3) UL (ref 1.5–7.7)
NEUTROPHILS NFR BLD AUTO: 48.4 %
OSMOLALITY SERPL CALC.SUM OF ELEC: 277 MOSM/KG (ref 275–295)
P AXIS: 52 DEGREES
P-R INTERVAL: 198 MS
PLATELET # BLD AUTO: 323 10(3)UL (ref 150–450)
POTASSIUM SERPL-SCNC: 3.6 MMOL/L (ref 3.5–5.1)
PROT SERPL-MCNC: 7.4 G/DL (ref 6.4–8.2)
Q-T INTERVAL: 386 MS
QRS DURATION: 96 MS
QTC CALCULATION (BEZET): 453 MS
R AXIS: 32 DEGREES
RBC # BLD AUTO: 4.43 X10(6)UL
SODIUM SERPL-SCNC: 134 MMOL/L (ref 136–145)
T AXIS: 77 DEGREES
VENTRICULAR RATE: 83 BPM
WBC # BLD AUTO: 7.8 X10(3) UL (ref 4–11)

## 2024-07-05 PROCEDURE — BF13YZZ FLUOROSCOPY OF GALLBLADDER AND BILE DUCTS USING OTHER CONTRAST: ICD-10-PCS | Performed by: STUDENT IN AN ORGANIZED HEALTH CARE EDUCATION/TRAINING PROGRAM

## 2024-07-05 PROCEDURE — 74300 X-RAY BILE DUCTS/PANCREAS: CPT | Performed by: STUDENT IN AN ORGANIZED HEALTH CARE EDUCATION/TRAINING PROGRAM

## 2024-07-05 PROCEDURE — 99233 SBSQ HOSP IP/OBS HIGH 50: CPT | Performed by: HOSPITALIST

## 2024-07-05 PROCEDURE — 47563 LAPARO CHOLECYSTECTOMY/GRAPH: CPT | Performed by: STUDENT IN AN ORGANIZED HEALTH CARE EDUCATION/TRAINING PROGRAM

## 2024-07-05 PROCEDURE — 0FT44ZZ RESECTION OF GALLBLADDER, PERCUTANEOUS ENDOSCOPIC APPROACH: ICD-10-PCS | Performed by: STUDENT IN AN ORGANIZED HEALTH CARE EDUCATION/TRAINING PROGRAM

## 2024-07-05 RX ORDER — HYDROMORPHONE HYDROCHLORIDE 1 MG/ML
0.2 INJECTION, SOLUTION INTRAMUSCULAR; INTRAVENOUS; SUBCUTANEOUS EVERY 5 MIN PRN
Status: DISCONTINUED | OUTPATIENT
Start: 2024-07-05 | End: 2024-07-05 | Stop reason: HOSPADM

## 2024-07-05 RX ORDER — HYDROMORPHONE HYDROCHLORIDE 1 MG/ML
INJECTION, SOLUTION INTRAMUSCULAR; INTRAVENOUS; SUBCUTANEOUS
Status: COMPLETED
Start: 2024-07-05 | End: 2024-07-05

## 2024-07-05 RX ORDER — HYDROMORPHONE HYDROCHLORIDE 1 MG/ML
0.4 INJECTION, SOLUTION INTRAMUSCULAR; INTRAVENOUS; SUBCUTANEOUS EVERY 5 MIN PRN
Status: DISCONTINUED | OUTPATIENT
Start: 2024-07-05 | End: 2024-07-05 | Stop reason: HOSPADM

## 2024-07-05 RX ORDER — ONDANSETRON 2 MG/ML
4 INJECTION INTRAMUSCULAR; INTRAVENOUS EVERY 6 HOURS PRN
Status: DISCONTINUED | OUTPATIENT
Start: 2024-07-05 | End: 2024-07-05 | Stop reason: HOSPADM

## 2024-07-05 RX ORDER — HYDROCODONE BITARTRATE AND ACETAMINOPHEN 5; 325 MG/1; MG/1
1 TABLET ORAL ONCE AS NEEDED
Status: DISCONTINUED | OUTPATIENT
Start: 2024-07-05 | End: 2024-07-05 | Stop reason: HOSPADM

## 2024-07-05 RX ORDER — HYDRALAZINE HYDROCHLORIDE 20 MG/ML
10 INJECTION INTRAMUSCULAR; INTRAVENOUS ONCE
Status: COMPLETED | OUTPATIENT
Start: 2024-07-05 | End: 2024-07-05

## 2024-07-05 RX ORDER — HYDROMORPHONE HYDROCHLORIDE 1 MG/ML
0.6 INJECTION, SOLUTION INTRAMUSCULAR; INTRAVENOUS; SUBCUTANEOUS EVERY 5 MIN PRN
Status: DISCONTINUED | OUTPATIENT
Start: 2024-07-05 | End: 2024-07-05 | Stop reason: HOSPADM

## 2024-07-05 RX ORDER — MEPERIDINE HYDROCHLORIDE 25 MG/ML
12.5 INJECTION INTRAMUSCULAR; INTRAVENOUS; SUBCUTANEOUS AS NEEDED
Status: DISCONTINUED | OUTPATIENT
Start: 2024-07-05 | End: 2024-07-05 | Stop reason: HOSPADM

## 2024-07-05 RX ORDER — ACETAMINOPHEN 500 MG
1000 TABLET ORAL ONCE AS NEEDED
Status: DISCONTINUED | OUTPATIENT
Start: 2024-07-05 | End: 2024-07-05 | Stop reason: HOSPADM

## 2024-07-05 RX ORDER — KETOROLAC TROMETHAMINE 30 MG/ML
INJECTION, SOLUTION INTRAMUSCULAR; INTRAVENOUS AS NEEDED
Status: DISCONTINUED | OUTPATIENT
Start: 2024-07-05 | End: 2024-07-05 | Stop reason: SURG

## 2024-07-05 RX ORDER — LIDOCAINE HYDROCHLORIDE 10 MG/ML
INJECTION, SOLUTION EPIDURAL; INFILTRATION; INTRACAUDAL; PERINEURAL AS NEEDED
Status: DISCONTINUED | OUTPATIENT
Start: 2024-07-05 | End: 2024-07-05 | Stop reason: SURG

## 2024-07-05 RX ORDER — LABETALOL HYDROCHLORIDE 5 MG/ML
INJECTION, SOLUTION INTRAVENOUS
Status: COMPLETED
Start: 2024-07-05 | End: 2024-07-05

## 2024-07-05 RX ORDER — CARVEDILOL 6.25 MG/1
6.25 TABLET ORAL 2 TIMES DAILY WITH MEALS
Status: SHIPPED | COMMUNITY
Start: 2024-07-05

## 2024-07-05 RX ORDER — SODIUM CHLORIDE, SODIUM LACTATE, POTASSIUM CHLORIDE, CALCIUM CHLORIDE 600; 310; 30; 20 MG/100ML; MG/100ML; MG/100ML; MG/100ML
INJECTION, SOLUTION INTRAVENOUS CONTINUOUS
Status: DISCONTINUED | OUTPATIENT
Start: 2024-07-05 | End: 2024-07-05 | Stop reason: HOSPADM

## 2024-07-05 RX ORDER — MIDAZOLAM HYDROCHLORIDE 1 MG/ML
1 INJECTION INTRAMUSCULAR; INTRAVENOUS EVERY 5 MIN PRN
Status: DISCONTINUED | OUTPATIENT
Start: 2024-07-05 | End: 2024-07-05 | Stop reason: HOSPADM

## 2024-07-05 RX ORDER — SODIUM CHLORIDE, SODIUM LACTATE, POTASSIUM CHLORIDE, CALCIUM CHLORIDE 600; 310; 30; 20 MG/100ML; MG/100ML; MG/100ML; MG/100ML
INJECTION, SOLUTION INTRAVENOUS CONTINUOUS PRN
Status: DISCONTINUED | OUTPATIENT
Start: 2024-07-05 | End: 2024-07-05 | Stop reason: SURG

## 2024-07-05 RX ORDER — ONDANSETRON 2 MG/ML
INJECTION INTRAMUSCULAR; INTRAVENOUS AS NEEDED
Status: DISCONTINUED | OUTPATIENT
Start: 2024-07-05 | End: 2024-07-05 | Stop reason: SURG

## 2024-07-05 RX ORDER — LIDOCAINE HYDROCHLORIDE 40 MG/ML
SOLUTION TOPICAL AS NEEDED
Status: DISCONTINUED | OUTPATIENT
Start: 2024-07-05 | End: 2024-07-05 | Stop reason: SURG

## 2024-07-05 RX ORDER — CARVEDILOL 6.25 MG/1
6.25 TABLET ORAL 2 TIMES DAILY WITH MEALS
Status: DISCONTINUED | OUTPATIENT
Start: 2024-07-05 | End: 2024-07-06

## 2024-07-05 RX ORDER — DEXAMETHASONE SODIUM PHOSPHATE 4 MG/ML
VIAL (ML) INJECTION AS NEEDED
Status: DISCONTINUED | OUTPATIENT
Start: 2024-07-05 | End: 2024-07-05 | Stop reason: SURG

## 2024-07-05 RX ORDER — HYDRALAZINE HYDROCHLORIDE 20 MG/ML
INJECTION INTRAMUSCULAR; INTRAVENOUS
Status: COMPLETED
Start: 2024-07-05 | End: 2024-07-05

## 2024-07-05 RX ORDER — SODIUM CHLORIDE 9 MG/ML
INJECTION, SOLUTION INTRAVENOUS CONTINUOUS
Status: DISCONTINUED | OUTPATIENT
Start: 2024-07-05 | End: 2024-07-05

## 2024-07-05 RX ORDER — HYDROCODONE BITARTRATE AND ACETAMINOPHEN 5; 325 MG/1; MG/1
2 TABLET ORAL ONCE AS NEEDED
Status: DISCONTINUED | OUTPATIENT
Start: 2024-07-05 | End: 2024-07-05 | Stop reason: HOSPADM

## 2024-07-05 RX ORDER — MIDAZOLAM HYDROCHLORIDE 1 MG/ML
INJECTION INTRAMUSCULAR; INTRAVENOUS AS NEEDED
Status: DISCONTINUED | OUTPATIENT
Start: 2024-07-05 | End: 2024-07-05 | Stop reason: SURG

## 2024-07-05 RX ORDER — BUPIVACAINE HYDROCHLORIDE 2.5 MG/ML
INJECTION, SOLUTION EPIDURAL; INFILTRATION; INTRACAUDAL AS NEEDED
Status: DISCONTINUED | OUTPATIENT
Start: 2024-07-05 | End: 2024-07-05 | Stop reason: HOSPADM

## 2024-07-05 RX ORDER — LABETALOL HYDROCHLORIDE 5 MG/ML
5 INJECTION, SOLUTION INTRAVENOUS EVERY 5 MIN PRN
Status: DISCONTINUED | OUTPATIENT
Start: 2024-07-05 | End: 2024-07-05 | Stop reason: HOSPADM

## 2024-07-05 RX ORDER — PROCHLORPERAZINE EDISYLATE 5 MG/ML
5 INJECTION INTRAMUSCULAR; INTRAVENOUS EVERY 8 HOURS PRN
Status: DISCONTINUED | OUTPATIENT
Start: 2024-07-05 | End: 2024-07-05 | Stop reason: HOSPADM

## 2024-07-05 RX ORDER — HYDROCODONE BITARTRATE AND ACETAMINOPHEN 5; 325 MG/1; MG/1
1 TABLET ORAL EVERY 6 HOURS PRN
Qty: 15 TABLET | Refills: 0 | Status: SHIPPED | OUTPATIENT
Start: 2024-07-05

## 2024-07-05 RX ORDER — GLYCOPYRROLATE 0.2 MG/ML
INJECTION, SOLUTION INTRAMUSCULAR; INTRAVENOUS AS NEEDED
Status: DISCONTINUED | OUTPATIENT
Start: 2024-07-05 | End: 2024-07-05 | Stop reason: SURG

## 2024-07-05 RX ORDER — ROCURONIUM BROMIDE 10 MG/ML
INJECTION, SOLUTION INTRAVENOUS AS NEEDED
Status: DISCONTINUED | OUTPATIENT
Start: 2024-07-05 | End: 2024-07-05 | Stop reason: SURG

## 2024-07-05 RX ORDER — NALOXONE HYDROCHLORIDE 0.4 MG/ML
0.08 INJECTION, SOLUTION INTRAMUSCULAR; INTRAVENOUS; SUBCUTANEOUS AS NEEDED
Status: DISCONTINUED | OUTPATIENT
Start: 2024-07-05 | End: 2024-07-05 | Stop reason: HOSPADM

## 2024-07-05 RX ADMIN — ROCURONIUM BROMIDE 50 MG: 10 INJECTION, SOLUTION INTRAVENOUS at 09:26:00

## 2024-07-05 RX ADMIN — SODIUM CHLORIDE, SODIUM LACTATE, POTASSIUM CHLORIDE, CALCIUM CHLORIDE: 600; 310; 30; 20 INJECTION, SOLUTION INTRAVENOUS at 09:15:00

## 2024-07-05 RX ADMIN — LIDOCAINE HYDROCHLORIDE 4 ML: 40 SOLUTION TOPICAL at 09:21:00

## 2024-07-05 RX ADMIN — GLYCOPYRROLATE 0.2 MG: 0.2 INJECTION, SOLUTION INTRAMUSCULAR; INTRAVENOUS at 09:20:00

## 2024-07-05 RX ADMIN — ROCURONIUM BROMIDE 20 MG: 10 INJECTION, SOLUTION INTRAVENOUS at 09:54:00

## 2024-07-05 RX ADMIN — ONDANSETRON 4 MG: 2 INJECTION INTRAMUSCULAR; INTRAVENOUS at 10:30:00

## 2024-07-05 RX ADMIN — LIDOCAINE HYDROCHLORIDE 50 MG: 10 INJECTION, SOLUTION EPIDURAL; INFILTRATION; INTRACAUDAL; PERINEURAL at 09:20:00

## 2024-07-05 RX ADMIN — KETOROLAC TROMETHAMINE 30 MG: 30 INJECTION, SOLUTION INTRAMUSCULAR; INTRAVENOUS at 10:30:00

## 2024-07-05 RX ADMIN — DEXAMETHASONE SODIUM PHOSPHATE 8 MG: 4 MG/ML VIAL (ML) INJECTION at 09:26:00

## 2024-07-05 RX ADMIN — MIDAZOLAM HYDROCHLORIDE 2 MG: 1 INJECTION INTRAMUSCULAR; INTRAVENOUS at 09:15:00

## 2024-07-05 NOTE — ANESTHESIA PREPROCEDURE EVALUATION
PRE-OP EVALUATION    Patient Name: Cain Boone    Admit Diagnosis: Acute cholecystitis [K81.0]    Pre-op Diagnosis: Cholecystitis [K81.9]    LAPAROSCOPIC CHOLECYSTECTOMY WITH CHOLANGIOGRAM, POSSIBLE OPEN    Anesthesia Procedure: LAPAROSCOPIC CHOLECYSTECTOMY WITH CHOLANGIOGRAM, POSSIBLE OPEN    Surgeons and Role:     * Alirio Dale MD - Primary    Pre-op vitals reviewed.  Temp: 98.3 °F (36.8 °C)  Pulse: 72  Resp: 18  BP: 157/93  SpO2: 100 %  Body mass index is 58.2 kg/m².    Current medications reviewed.  Hospital Medications:   sodium chloride 0.9% infusion   Intravenous Continuous    HYDROmorphone (Dilaudid) 1 MG/ML injection 1 mg  1 mg Intravenous Q30 Min PRN    [COMPLETED] ondansetron (Zofran) 4 MG/2ML injection 4 mg  4 mg Intravenous Once    [COMPLETED] pantoprazole (Protonix) 40 mg in sodium chloride 0.9% PF 10 mL IV push  40 mg Intravenous Once    amLODIPine (Norvasc) tab 10 mg  10 mg Oral Daily    acetaminophen (Tylenol Extra Strength) tab 1,000 mg  1,000 mg Oral Q4H PRN    melatonin tab 3 mg  3 mg Oral Nightly PRN    glycerin-hypromellose- (Artificial Tears) 0.2-0.2-1 % ophthalmic solution 1 drop  1 drop Both Eyes QID PRN    sodium chloride (Saline Mist) 0.65 % nasal solution 1 spray  1 spray Each Nare Q3H PRN    acetaminophen (Tylenol) tab 650 mg  650 mg Oral Q4H PRN    Or    HYDROcodone-acetaminophen (Norco) 5-325 MG per tab 1 tablet  1 tablet Oral Q4H PRN    Or    HYDROcodone-acetaminophen (Norco) 5-325 MG per tab 2 tablet  2 tablet Oral Q4H PRN    HYDROmorphone (Dilaudid) 1 MG/ML injection 0.2 mg  0.2 mg Intravenous Q2H PRN    Or    HYDROmorphone (Dilaudid) 1 MG/ML injection 0.4 mg  0.4 mg Intravenous Q2H PRN    Or    HYDROmorphone (Dilaudid) 1 MG/ML injection 0.8 mg  0.8 mg Intravenous Q2H PRN    ondansetron (Zofran) 4 MG/2ML injection 4 mg  4 mg Intravenous Q6H PRN    prochlorperazine (Compazine) 10 MG/2ML injection 5 mg  5 mg Intravenous Q8H PRN    polyethylene glycol (PEG 3350) (Miralax)  17 g oral packet 17 g  17 g Oral Daily PRN    sennosides (Senokot) tab 17.2 mg  17.2 mg Oral Nightly PRN    bisacodyl (Dulcolax) 10 MG rectal suppository 10 mg  10 mg Rectal Daily PRN    fleet enema (Fleet) 7-19 GM/118ML rectal enema 133 mL  1 enema Rectal Once PRN    hydrALAzine (Apresoline) 20 mg/mL injection 10 mg  10 mg Intravenous Q6H PRN    labetalol (Trandate) 5 mg/mL injection 10 mg  10 mg Intravenous Q4H PRN    hydrOXYzine (Atarax) tab 10 mg  10 mg Oral TID PRN    diphenhydrAMINE (Benadryl) 50 mg/mL  injection 12.5 mg  12.5 mg Intravenous Q6H PRN    lidocaine-menthol 4-1 % patch 1 patch  1 patch Transdermal Daily PRN    benzocaine-menthol (Cepacol) lozenge 1 lozenge  1 lozenge Oral PRN    simethicone (Mylicon) chewable tab 80 mg  80 mg Oral QID PRN    benzonatate (Tessalon) cap 200 mg  200 mg Oral TID PRN    calcium carbonate (Tums) chewable tab 500 mg  500 mg Oral TID PRN    piperacillin-tazobactam (Zosyn) 3.375 g in dextrose 5% 100 mL IVPB-ADDV  3.375 g Intravenous Q8H       Outpatient Medications:     Medications Prior to Admission   Medication Sig Dispense Refill Last Dose    carvedilol 6.25 MG Oral Tab Take 1 tablet (6.25 mg total) by mouth daily.       hydrochlorothiazide 25 MG Oral Tab Take 1 tablet (25 mg total) by mouth daily.   Taking    amLODIPine besylate 10 MG Oral Tab Take 1 tablet (10 mg total) by mouth daily.   Taking    guaiFENesin-codeine 100-10 MG/5ML Oral Solution Take 10 mL by mouth every 6 (six) hours as needed for cough. Do not drive or operate machinery within 8 hours of taking this medication 200 mL 0     predniSONE 50 MG Oral Tab Take 1 tablet (50 mg total) by mouth daily. 5 tablet 0     oxyCODONE 5 MG Oral Tab Take 1 tablet (5 mg total) by mouth every 8 (eight) hours as needed for Pain. (Patient not taking: Reported on 8/16/2021 ) 5 tablet 0        Allergies: Patient has no known allergies.      Anesthesia Evaluation    Patient summary reviewed.    Anesthetic Complications  (-)  history of anesthetic complications         GI/Hepatic/Renal                                 Cardiovascular        Exercise tolerance: good     MET: >4    (+) obesity  (+) hypertension                                     Endo/Other                                  Pulmonary                           Neuro/Psych                                    Past Surgical History:   Procedure Laterality Date    Appendectomy  2021          Tonsillectomy       Social History     Socioeconomic History    Marital status: Single   Tobacco Use    Smoking status: Never    Smokeless tobacco: Never   Vaping Use    Vaping status: Never Used   Substance and Sexual Activity    Alcohol use: Yes     Comment: socially    Drug use: Never     History   Drug Use Unknown     Available pre-op labs reviewed.  Lab Results   Component Value Date    WBC 7.8 2024    RBC 4.43 2024    HGB 10.5 (L) 2024    HCT 34.3 (L) 2024    MCV 77.4 (L) 2024    MCH 23.7 (L) 2024    MCHC 30.6 (L) 2024    RDW 16.3 2024    .0 2024     Lab Results   Component Value Date     (L) 2024    K 3.6 2024     2024    CO2 28.0 2024    BUN 8 (L) 2024    CREATSERUM 0.97 2024     (H) 2024    CA 8.9 2024            Airway      Mallampati: III  Mouth opening: >3 FB  TM distance: 4 - 6 cm  Neck ROM: full Cardiovascular    Cardiovascular exam normal.         Dental             Pulmonary    Pulmonary exam normal.                 Other findings              ASA: 3   Plan: general  NPO status verified and patient meets guidelines.          Plan/risks discussed with: patient                Present on Admission:   Hyponatremia   Anemia   Hyperglycemia

## 2024-07-05 NOTE — H&P (VIEW-ONLY)
[facility]  Report of Consultation    Cain Boone Patient Status:  Inpatient    1985 MRN [id number]   Location [facility] 3NW-A Attending Dorian Escobar MD   Hosp Day # 0 [facility]     Reason for Consultation:  Concern for acute cholecystitis    History of Present Illness:  Cain Boone is a very nice 39-year-old female who presented to the emergency room today with right upper quadrant abdominal pain.  The pain began around 4 AM this morning.  She states it is severe and limited her activity and breathing.  It is located primarily beneath the right breast and radiates toward the right flank.  Pain is associated with nausea and vomiting.  Patient is also felt constipated.  She tried taking over-the-counter antacids and laxatives without relief.  No fevers or jaundice.  Patient last ate ice cream, chips and tacos yesterday night.    Patient was worked up with labs and ultrasound imaging in the emergency room.  Labs were essentially unremarkable.  Ultrasound imaging showed a dilated gallbladder with positive sonographic Malone sign.  There was a 1.2 cm stone in the gallbladder neck and gallbladder wall thickening.  These findings were concerning for cholecystitis.  This prompted general surgery consultation.  Of note, patient is morbidly obese with BMI 58.  Prior abdominal surgery includes laparoscopic appendectomy.  No blood thinners.  Patient works as a .    History:  Past Medical History:    Essential hypertension     Past Surgical History:   Procedure Laterality Date    Appendectomy  2021          Tonsillectomy       No family history on file.   reports that she has never smoked. She has never used smokeless tobacco. She reports current alcohol use. She reports that she does not use drugs.    Allergies:  No Known Allergies    Medications:    Current Facility-Administered Medications:     HYDROmorphone (Dilaudid) 1 MG/ML injection 1 mg, 1 mg, Intravenous,  Q30 Min PRN    amLODIPine (Norvasc) tab 10 mg, 10 mg, Oral, Daily    acetaminophen (Tylenol Extra Strength) tab 1,000 mg, 1,000 mg, Oral, Q4H PRN    melatonin tab 3 mg, 3 mg, Oral, Nightly PRN    glycerin-hypromellose- (Artificial Tears) 0.2-0.2-1 % ophthalmic solution 1 drop, 1 drop, Both Eyes, QID PRN    sodium chloride (Saline Mist) 0.65 % nasal solution 1 spray, 1 spray, Each Nare, Q3H PRN    acetaminophen (Tylenol) tab 650 mg, 650 mg, Oral, Q4H PRN **OR** HYDROcodone-acetaminophen (Norco) 5-325 MG per tab 1 tablet, 1 tablet, Oral, Q4H PRN **OR** HYDROcodone-acetaminophen (Norco) 5-325 MG per tab 2 tablet, 2 tablet, Oral, Q4H PRN    HYDROmorphone (Dilaudid) 1 MG/ML injection 0.2 mg, 0.2 mg, Intravenous, Q2H PRN **OR** HYDROmorphone (Dilaudid) 1 MG/ML injection 0.4 mg, 0.4 mg, Intravenous, Q2H PRN **OR** HYDROmorphone (Dilaudid) 1 MG/ML injection 0.8 mg, 0.8 mg, Intravenous, Q2H PRN    ondansetron (Zofran) 4 MG/2ML injection 4 mg, 4 mg, Intravenous, Q6H PRN    prochlorperazine (Compazine) 10 MG/2ML injection 5 mg, 5 mg, Intravenous, Q8H PRN    polyethylene glycol (PEG 3350) (Miralax) 17 g oral packet 17 g, 17 g, Oral, Daily PRN    sennosides (Senokot) tab 17.2 mg, 17.2 mg, Oral, Nightly PRN    bisacodyl (Dulcolax) 10 MG rectal suppository 10 mg, 10 mg, Rectal, Daily PRN    fleet enema (Fleet) 7-19 GM/118ML rectal enema 133 mL, 1 enema, Rectal, Once PRN    dextrose in lactated ringers 5% infusion, , Intravenous, Continuous    hydrALAzine (Apresoline) 20 mg/mL injection 10 mg, 10 mg, Intravenous, Q6H PRN    labetalol (Trandate) 5 mg/mL injection 10 mg, 10 mg, Intravenous, Q4H PRN    hydrOXYzine (Atarax) tab 10 mg, 10 mg, Oral, TID PRN    diphenhydrAMINE (Benadryl) 50 mg/mL  injection 12.5 mg, 12.5 mg, Intravenous, Q6H PRN    lidocaine-menthol 4-1 % patch 1 patch, 1 patch, Transdermal, Daily PRN    benzocaine-menthol (Cepacol) lozenge 1 lozenge, 1 lozenge, Oral, PRN    simethicone (Mylicon) chewable tab 80  mg, 80 mg, Oral, QID PRN    benzonatate (Tessalon) cap 200 mg, 200 mg, Oral, TID PRN    calcium carbonate (Tums) chewable tab 500 mg, 500 mg, Oral, TID PRN    piperacillin-tazobactam (Zosyn) 3.375 g in dextrose 5% 100 mL IVPB-ADDV, 3.375 g, Intravenous, Q8H    Review of Systems:  A comprehensive 10 point review of systems was completed.  Pertinent positives and negatives noted in the the HPI.    Physical Exam:  Blood pressure (!) 146/93, pulse 77, temperature 97.6 °F (36.4 °C), temperature source Oral, resp. rate 18, height 60\", weight 298 lb (135.2 kg), last menstrual period 06/05/2024, SpO2 100%.    General: Awake, alert, no apparent distress, answering questions appropriately  HEENT: Exam is unremarkable.  Without scleral icterus.  Mucous membranes are moist.  Lungs: Non-labored breathing.  Cardiac: Hemodynamically normal.   Abdomen: Obese, soft, non-distended, tender to palpation in the right upper quadrant  Extremities:  No gross deformities  Neurologic: Cranial nerves are grossly intact.  Motor strength and sensory examination is grossly normal.  No focal neurologic deficit.  Skin: Warm and dry.  Psych: Appropriate mood and affect    Laboratory Data:  Reviewed    Imaging:  I have personally reviewed the imaging of patient's abdominal ultrasound    Impression and Plan:  Patient Active Problem List   Diagnosis    Hyponatremia    Anemia    Hyperglycemia    Acute cholecystitis       Cain Boone is a very nice 39-year-old female who presented to the emergency room today with right upper quadrant abdominal pain.  The pain began around 4 AM this morning.  She states it is severe and limited her activity and breathing.  It is located primarily beneath the right breast and radiates toward the right flank.  Pain is associated with nausea and vomiting.  Patient is also felt constipated.  She tried taking over-the-counter antacids and laxatives without relief.  No fevers or jaundice.  Patient last ate ice cream, chips  and tacos yesterday night.    Patient was worked up with labs and ultrasound imaging in the emergency room.  Labs were essentially unremarkable.  Ultrasound imaging showed a dilated gallbladder with positive sonographic Malone sign.  There was a 1.2 cm stone in the gallbladder neck and gallbladder wall thickening.  These findings were concerning for cholecystitis.  This prompted general surgery consultation.  Of note, patient is morbidly obese with BMI 58.  Prior abdominal surgery includes laparoscopic appendectomy.  No blood thinners.  Patient works as a .    Patient is generally well-appearing on exam.  She is afebrile and hemodynamically normal.  She does have exquisite right upper quadrant tenderness.  Overall, her clinical picture appears consistent with acute cholecystitis.    Recommendations:  I recommend proceeding urgently to the operating room for laparoscopic cholecystectomy with intraoperative cholangiogram, possible open.  The details of the procedure were discussed including the expected recovery time, risks, benefits and alternatives.  Patient and family expressed understanding and were agreeable to surgery.  Patient has been added onto my operative schedule for tomorrow given lack of OR availability until midnight or later.      In the meantime, patient can have clear liquid diet.  N.p.o. after midnight.  Okay for pain and nausea medication as needed.  Antibiotics started.  Okay for DVT prophylaxis.  Appreciate hospitalist service input.     Postoperative disposition to be determined pending surgical findings and intraoperative course.  Hopeful for discharge same day of surgery versus next day.     Alirio Dale MD  [facility] [place] [facility]      Alirio Dale MD  7/4/2024  10:04 PM

## 2024-07-05 NOTE — PAYOR COMM NOTE
--------------  ADMISSION REVIEW     Payor: MARA  Subscriber #:  [id number]  Authorization Number: N/[id number]    Admit date: 7/4/24  Admit time:  4:38 PM       REVIEW DOCUMENTATION:     ED Provider Notes          Stated Complaint: abdominal pain, RUQ    Subjective:   HPI    Patient presents with abdominal pain.  The patient states that she woke up with this pain about 4 AM.  It is in the upper abdomen and then she has additional sharp pains intermittently to the right upper quadrant.  She thought maybe she needed a bowel movement because she did not have 1 yesterday.  She took Tums and a laxative.  She was able to have a small bowel movement here at the emergency department that did not give her any relief.  She states she also vomited this morning.  She does not feel short of breath.  She denies any urinary symptoms.        Physical Exam     ED Triage Vitals   BP 07/04/24 1017 (!) 170/112   Pulse 07/04/24 1009 98   Resp 07/04/24 1009 18   Temp 07/04/24 1009 98 °F (36.7 °C)   Temp src 07/04/24 1749 Oral   SpO2 07/04/24 1009 96 %   O2 Device 07/04/24 1009 None (Room air)       Current Vitals:   Vital Signs  BP: (!) 157/93  Pulse: 72  Resp: 18  Temp: 98.3 °F (36.8 °C)  Temp src: Oral  MAP (mmHg): (!) 107    Oxygen Therapy  SpO2: 100 %  O2 Device: None (Room air)  Pulse Oximetry Type: Intermittent  Oximetry Probe Site Changed: Yes  Pulse Ox Probe Location: Left hand            Physical Exam    General: Alert and oriented x3, appears uncomfortable.  HEENT: Normocephalic, atraumatic, pupils equal round and reactive to light, oropharynx clear, uvula midline.  Neck: Supple.  Cardiovascular: Regular rate and rhythm.  Respiratory: Lungs clear to auscultation.  Abdomen: Soft, tender to palpation across the upper abdomen but greatest in right upper quadrant, no rebound or guarding, normal active bowel sounds, no CVA tenderness.  Extremities: No CCE.  Skin: Warm and dry.    ED Course     Labs Reviewed   URINALYSIS, ROUTINE -  Abnormal; Notable for the following components:       Result Value    Urine Color Colorless (*)     All other components within normal limits   COMP METABOLIC PANEL (14) - Abnormal; Notable for the following components:    Glucose 120 (*)     Sodium 133 (*)     AST 11 (*)     Total Protein 8.5 (*)     Globulin  5.0 (*)     A/G Ratio 0.7 (*)     All other components within normal limits   CBC W/ DIFFERENTIAL - Abnormal; Notable for the following components:    HGB 11.4 (*)     MCV 76.5 (*)     MCH 24.2 (*)     All other components within normal limits   CBC W/ DIFFERENTIAL - Abnormal; Notable for the following components:    HGB 10.5 (*)     HCT 34.3 (*)     MCV 77.4 (*)     MCH 23.7 (*)     MCHC 30.6 (*)     All other components within normal limits   EKG    Rate, intervals and axes as noted on EKG Report.  Rate: 83  Rhythm: Sinus Rhythm  Reading: No acute ischemic abnormality  US ABDOMEN COMPLETE (CPT=76700)    Result Date: 7/4/2024  PROCEDURE:  US ABDOMEN COMPLETE (CPT=76700)  COMPARISON:  None.  INDICATIONS:  abdominal pain, RUQ  TECHNIQUE:  Real time gray-scale ultrasound was used to evaluate the abdomen.  The exam includes images of the liver, gallbladder, common bile duct, pancreas, spleen, kidneys, IVC, and aorta.  PATIENT STATED HISTORY: (As transcribed by Technologist)     FINDINGS:  Exam limited with patient body habitus, difficulty holding breath, limited exam.  No hepatic abnormality seen.  Common bile duct 3.2 mm, without dilation.  Gallstones are with 2.1 cm stone gallbladder neck, with gallbladder dilation, and gallbladder wall thickening 4.5 mm.  Positive Malone sign.  Pancreas obscured by bowel gas.  No splenic or renal abnormality, kidneys right kidney 10.2 cm, left kidney 10.8 cm.  Aorta IVC patent.  No ascites or pleural effusion.             CONCLUSION:  Dilated gallbladder, with positive Malone sign, 2.1 cm stone gallbladder neck, and gallbladder wall thickening.  Concern for cholecystitis.    LOCATION:  [facility]    Dictated by (CST): Daren Villa MD on 2024 at 2:51 PM     Finalized by (CST): Daren Villa MD on 2024 at 2:52 PM        The patient was given Dilaudid with Zofran as needed for pain and did require repeat dosing.  She was given Protonix additionally.  Her blood pressure did improve with pain relief.  She thinks she did vomit her blood pressure medicine this morning as well.  These factors likely explain her hypertension on arrival.      MDM      Patient presents with abdominal pain.  Differential diagnosis includes but is not limited to acute cholecystitis, acute pancreatitis and early gastroenteritis.  The patient does have evidence of acute cholecystitis on imaging.  She does not have a fever or leukocytosis.  Her liver enzymes and lipase have come back within normal limits.  She does have ongoing pain despite medications.  I discussed her case with Dr. Dale from general surgery who will take her to the OR when it is available.  He requested admission to the hospitalist and Dr. Escobar has agreed to admit her primarily.  Patient was updated on the findings and plan of care and is in agreement.  Admission disposition: 2024  3:56 PM      [facility]    Disposition and Plan     Clinical Impression:  1. Acute cholecystitis         Disposition:  Admit  2024  3:56 pm     [place]  History and Physical            Cain Boone Patient Status:  Inpatient    1985 MRN [id number]   Location [facility] 3NW-A Attending Dorian Escobar MD   Hosp Day # 0 [facility]      Chief Complaint:       Chief Complaint   Patient presents with    Abdomen/Flank Pain            Subjective:  History of Present Illness:      Cain Boone is a 39 year old female with a past medical history of HTN.  She comes to the ED due to abdominal pain which started earlier today.  Pain over most of her abdomen but mostly in RUQ.  US done with signs of acute cholecystitis.  BP elevated in ED  when pain was high. Better now after IV narcotics.       Assessment & Plan:  #Acute cholecystitis  US with dilated GB and stone in GB neck  Afebrile, WBC WNL  LFT's not elevated  Eval for cholecystectomy  Pain control  IVF  Bowel rest     #HTN  Elevated with pain  Norvasc  PRN IV meds ordered     #Morbid Obesity  Body mass index is 58.2 kg/m².     DVT PPX: start post op. Lovenox if not going to OR today or tomorrow        All diagnosis' and recommendations discussed with patient and/or family in detail.       Plan of care discussed with ED physician        Dorian Escobar MD     SURGERY:      Operative Note           Cain Boone Location: OR   CSN [id number] MRN [id number]    1985 Age 39 year old   Admission Date 2024 Operation Date 2024   Attending Physician Dorian Escobar MD Operating Physician Alirio Dale MD   PCP B[facility]              Patient Name: Cain Boone     Preoperative Diagnosis: Cholecystitis [K81.9]     Postoperative Diagnosis: Acute cholecystitis     Primary Surgeon: Alirio Dale MD     Assistant: Natividad Serrato PA-C (her assistance was essential for the safe conduct of this case especially in performance of the intraoperative cholangiogram and aiding in dissection in Calot's triangle)     Anesthesia: General     Procedures: Laparoscopic cholecystectomy with intraoperative cholangiogram     Implants: None     Specimen: Gallbladder     Drains: None     Estimated Blood Loss: 10 cc     Complications: None immediate     Condition: Stable     Indications for Surgery:   Cain Boone is a very nice 39-year-old female who presented to the emergency room on 2024 with right upper quadrant abdominal pain.  The pain began around 4 AM this morning.  She states it is severe and limited her activity and breathing.  It is located primarily beneath the right breast and radiates toward the right flank.  Pain is associated with nausea and vomiting.  Patient is also felt constipated.   She tried taking over-the-counter antacids and laxatives without relief.  No fevers or jaundice.  Patient last ate ice cream, chips and tacos night prior to symptoms.     Patient was worked up with labs and ultrasound imaging in the emergency room.  Labs were essentially unremarkable.  Ultrasound imaging showed a dilated gallbladder with positive sonographic Malone sign.  There was a 1.2 cm stone in the gallbladder neck and gallbladder wall thickening.  These findings were concerning for cholecystitis.  This prompted general surgery consultation.  Of note, patient is morbidly obese with BMI 58.  Prior abdominal surgery includes laparoscopic appendectomy.  No blood thinners.  Patient works as a .     Patient is generally well-appearing on exam.  She is afebrile and hemodynamically normal.  She does have exquisite right upper quadrant tenderness.  Overall, her clinical picture appears consistent with acute cholecystitis.     Recommendations:  I recommend proceeding urgently to the operating room for laparoscopic cholecystectomy with intraoperative cholangiogram, possible open.  The details of the procedure were discussed including the expected recovery time, risks, benefits and alternatives.  Patient and family expressed understanding and were agreeable to surgery.  Patient has been added onto my operative schedule for today.  Consent was signed.  All questions answered.     Surgical Findings:   Distended gallbladder with pericholecystic edema consistent with acute cholecystitis.  Large approximately 2-3 cm gallstone palpated within the gallbladder.     Description of Procedure:   Patient was brought to the operating room and positioned supine on a pink foam pad.  Bilateral sequential compression devices were placed.  Preoperative antibiotics were given.  Patient was induced under general endotracheal anesthesia.  The abdomen was prepped and draped in the usual sterile fashion.  A timeout was  performed.     A 12 mm transverse skin incision was made just above the umbilicus. Pneumoperitoneum was established using a Veress needle at this site.  There was appropriately low opening pressure.  The Veress needle was removed and a 12 mm optical trocar was inserted into the peritoneal cavity under direct laparoscopic vision. There was no evidence of underlying visceral injury.  A 5 mm epigastric trocar was placed under direct vision.  Patient was positioned in reverse Trendelenburg and tilted with right side up.  Two additional 5 mm trocars were placed under direct vision beneath the right costal margin.     I identified the fundus of the gallbladder beneath the right lobe of the liver. The gallbladder fundus was retracted cephalad over the liver.  I began by scoring the peritoneum overlying the neck and infundibulum of the gallbladder using hook electrocautery.  The peritoneum and fatty-fibrotic tissue overlying the neck and infundibulum of the gallbladder was dissected off using a combination of sharp and blunt dissection with hook electrocautery and a [place] dissector. Eventually, I was able to establish a critical view of safety.      The cystic artery was ligated between metal clips and divided, two on the stay side and one on the specimen side.  The cystic duct was ligated close to its exit from the gallbladder using a metal clip.  The cystic duct was then partially divided and a cholangiocatheter was passed into the cystic duct over a wire under direct vision.  The catheter was secured in place at the cystic duct and a cholangiogram was performed showing rapid filling of the common bile duct, intrahepatic ducts, and duodenum without filling defects. The cholangiocatheter was removed and the stay portion of the cystic duct was ligated using 2 metal clips.  The cystic duct was then fully divided.      The remaining attachments between the gallbladder and liver bed were divided using hook electrocautery.  Any remaining small bleeding vessels encountered during this dissection were controlled with electrocautery. The gallbladder was placed in a specimen bag to be retrieved through the periumbilical port. The surgical field was copiously irrigated and suctioned.  The surgical field and liver bed appeared hemostatic.     The patient was leveled.  The fascia at the extraction site was widened with a curved 6 hemostat.  The gallbladder was successfully retrieved intact within the specimen bag.  The fascia at the extraction site was closed using an interrupted 0 PDS suture with the assistance of a Moiz-Cheko device.  The remaining trocars were removed under direct vision and appeared hemostatic. Pneumoperitoneum was evacuated.  Each skin incision was anesthetized using a total of 30 cc of 0.25% Marcaine.  Each skin incision was closed using 4-0 Monocryl in a subcuticular fashion.  The skin was cleaned and dried and skin glue was placed over each incision as a final dressing.     The patient was awakened from anesthesia, extubated and transported to the postanesthesia care unit in stable condition.  All sponge, needle and instrument counts were correct at the end of the case.  I was present for the entire case.        Alirio Dale MD  7/5/2024  11:09 AM                    MEDICATIONS ADMINISTERED IN LAST 1 DAY:  bupivacaine PF (Marcaine) 0.25% injection       Date Action Dose Route User    7/5/2024 0952 Given 30 mL Infiltration (Abdomen) Alirio Dale MD          ceFAZolin (Ancef) 3 g in sodium chloride 0.9% 100mL IVPB premix       Date Action Dose Route User    7/5/2024 0926 Given 3 g Intravenous Byron Vo CRNA          dexamethasone (Decadron) 4 MG/ML injection       Date Action Dose Route User    7/5/2024 0926 Given 8 mg Intravenous Byron Vo CRNA          dextrose in lactated ringers 5% infusion       Date Action Dose Route User    7/4/2024 1725 New Bag (none) Intravenous Cris Malone RN           fentaNYL (Sublimaze) 50 mcg/mL injection 50 mcg       Date Action Dose Route User    7/5/2024 1145 Given 50 mcg Intravenous Alisa Paredes RN    7/5/2024 1130 Given 50 mcg Intravenous Alisa Paredes RN          fentaNYL (Sublimaze) 50 mcg/mL injection       Date Action Dose Route User    7/5/2024 0956 Given 50 mcg Intravenous Byron Vo CRNA    7/5/2024 0920 Given 50 mcg Intravenous Byron Vo CRNA          fentaNYL (Sublimaze) 50 mcg/mL injection       Date Action Dose Route User    7/5/2024 1130 Given 50 mcg Intravenous Alisa Paredes RN          glycopyrrolate (Robinul) 0.2 MG/ML injection       Date Action Dose Route User    7/5/2024 0920 Given 0.2 mg Intravenous Byron Vo CRNA          hydrALAzine (Apresoline) 20 mg/mL injection       Date Action Dose Route User    7/5/2024 1201 Given 10 mg Intravenous Alisa Paredes RN          hydrALAzine (Apresoline) 20 mg/mL injection 10 mg       Date Action Dose Route User    7/5/2024 1201 Given 10 mg Intravenous McGAlisa sims RN          HYDROmorphone (Dilaudid) 1 MG/ML injection 1 mg       Date Action Dose Route User    7/4/2024 1419 Given 1 mg Intravenous Sam Blank RN          HYDROmorphone (Dilaudid) 1 MG/ML injection 0.4 mg       Date Action Dose Route User    7/5/2024 1311 Given 0.4 mg Intravenous Jeronimo Bond RN          HYDROmorphone (Dilaudid) 1 MG/ML injection 0.8 mg       Date Action Dose Route User    7/5/2024 0604 Given 0.8 mg Intravenous Terri Gilbert RN    7/5/2024 0021 Given 0.8 mg Intravenous Terri Gilbert RN    7/4/2024 1730 Given 0.8 mg Intravenous Cris Malone RN          HYDROmorphone (Dilaudid) 1 MG/ML injection 0.4 mg       Date Action Dose Route User    7/5/2024 1231 Given 0.4 mg Intravenous McGAlisa sims RN          HYDROmorphone (Dilaudid) 1 MG/ML injection       Date Action Dose Route User    7/5/2024 1231 Given 0.4 mg Intravenous Alisa Paredes RN           iohexol (Omnipaque) 300 MG/ML injection       Date Action Dose Route User    7/5/2024 0956 Given 20 mL Intravenous (Abdomen) Alirio Dale MD          ketorolac (Toradol) 30 MG/ML injection       Date Action Dose Route User    7/5/2024 1030 Given 30 mg Intravenous Byron Vo CRNA          labetalol (Trandate) 5 mg/mL injection 5 mg       Date Action Dose Route User    7/5/2024 1145 Given 5 mg Intravenous McGuckinAlisa RN    7/5/2024 1133 Given 5 mg Intravenous McGuckinAlisa RN    7/5/2024 1119 Given 5 mg Intravenous McGuckinAlisa RN          labetalol (Trandate) 5 mg/mL injection       Date Action Dose Route User    7/5/2024 1119 Given 5 mg Intravenous McGuckinAlisa RN          labetalol (Trandate) 5 mg/mL injection       Date Action Dose Route User    7/5/2024 1145 Given 5 mg Intravenous McGsalvadorinAlisa RN          lactated ringers infusion       Date Action Dose Route User    7/5/2024 1142 New Bag (none) Intravenous Alisa Paredes RN    7/5/2024 1111 Rate/Dose Change (none) Intravenous Alisa Paredes RN          lactated ringers infusion       Date Action Dose Route User    7/5/2024 0915 New Bag (none) Intravenous Byron Vo CRNA          lidocaine PF (Xylocaine-MPF) 1% injection       Date Action Dose Route User    7/5/2024 0920 Given 50 mg Injection Byron Vo CRNA          Lidocaine HCl (LARYNG-O-SET) 4 % solution       Date Action Dose Route User    7/5/2024 0921 Given 4 mL Endotracheal Byron Vo CRNA          midazolam (Versed) 2 MG/2ML injection       Date Action Dose Route User    7/5/2024 0915 Given 2 mg Intravenous Byron Vo CRNA          ondansetron (Zofran) 4 MG/2ML injection 4 mg       Date Action Dose Route User    7/4/2024 1730 Given 4 mg Intravenous Cris Malone RN          ondansetron (Zofran) 4 MG/2ML injection       Date Action Dose Route User    7/5/2024 1030 Given 4 mg Intravenous Byron Vo, CRNA           piperacillin-tazobactam (Zosyn) 3.375 g in dextrose 5% 100 mL IVPB-ADDV       Date Action Dose Route User    7/5/2024 1312 New Bag 3.375 g Intravenous Jeronimo Bond RN    7/5/2024 0604 New Bag 3.375 g Intravenous Terri Gilbert RN    7/4/2024 2137 New Bag 3.375 g Intravenous Terri Gilbert, KAREL          propofol (Diprivan) 10 MG/ML injection       Date Action Dose Route User    7/5/2024 1032 Given 50 mg Intravenous Byron Vo CRNA    7/5/2024 0920 Given 250 mg Intravenous Byron Vo CRNA          rocuronium (Zemuron) 50 mg/5mL injection       Date Action Dose Route User    7/5/2024 0954 Given 20 mg Intravenous Byron Vo CRNA    7/5/2024 0926 Given 50 mg Intravenous Byron Vo CRNA          sodium chloride 0.9% infusion       Date Action Dose Route User    7/5/2024 1312 New Bag (none) Intravenous Jeronimo Bond RN          succinylcholine (Anectine) 20 MG/ML injection       Date Action Dose Route User    7/5/2024 0920 Given 140 mg Intravenous Byron Vo CRNA          sugammadex (Bridion) 200 MG/2ML injection       Date Action Dose Route User    7/5/2024 1108 Given 100 mg Intravenous Byron Vo CRNA    7/5/2024 1048 Given 200 mg Intravenous Byron Vo CRNA            Vitals (last day)       Date/Time Temp Pulse Resp BP SpO2 Weight O2 Device O2 Flow Rate (L/min) Who    07/05/24 1310 98.4 °F (36.9 °C) 89 18 162/92 100 % -- Nasal cannula 3 L/min     07/05/24 1230 -- 89 18 -- 97 % -- Nasal cannula 3 L/min     07/05/24 1215 97.6 °F (36.4 °C) 82 17 154/82 97 % -- Nasal cannula 3 L/min     07/05/24 1200 -- 76 13 172/90 94 % -- Nasal cannula 3 L/min     07/05/24 1145 98.5 °F (36.9 °C) 83 15 173/114 95 % -- Nasal cannula 3 L/min     07/05/24 1130 -- 83 13 169/111 94 % -- Nasal cannula 3 L/min     07/05/24 1120 -- 89 18 173/108 100 % -- -- --     07/05/24 1115 -- 92 31 188/119 97 % -- Simple mask --     07/05/24 1112 99.1 °F (37.3 °C) 96 25 179/125 98 % -- Simple mask --     07/05/24  0553 98.3 °F (36.8 °C) 72 18 157/93 100 % -- None (Room air) -- MQ    07/05/24 0200 -- 63 -- 137/67 97 % -- -- -- RA    07/04/24 2204 99.2 °F (37.3 °C) 80 18 179/92 100 % -- None (Room air) -- MQ    07/04/24 1749 97.6 °F (36.4 °C) 77 18 146/93 100 % -- None (Room air) -- AK    07/04/24 1715 98.1 °F (36.7 °C) -- -- -- -- -- -- -- AC    07/04/24 1656 -- -- -- -- -- 298 lb (135.2 kg) -- -- AC    07/04/24 1613 -- 76 -- 135/75 97 % -- -- -- DM    07/04/24 1419 -- 64 18 138/91 99 % -- -- -- DM    07/04/24 1135 -- 83 16 151/93 98 % -- -- -- DM    07/04/24 1109 -- 88 -- 174/124 95 % -- None (Room air) --     07/04/24 1017 -- -- -- 170/112 -- -- -- --     07/04/24 1009 98 °F (36.7 °C) 98 18 -- 96 % 298 lb (135.2 kg) None (Room air) --

## 2024-07-05 NOTE — ED PROVIDER NOTES
Patient Seen in: [facility] 3nw-a      History     Chief Complaint   Patient presents with    Abdomen/Flank Pain     Stated Complaint: abdominal pain, RUQ    Subjective:   HPI    Patient presents with abdominal pain.  The patient states that she woke up with this pain about 4 AM.  It is in the upper abdomen and then she has additional sharp pains intermittently to the right upper quadrant.  She thought maybe she needed a bowel movement because she did not have 1 yesterday.  She took Tums and a laxative.  She was able to have a small bowel movement here at the emergency department that did not give her any relief.  She states she also vomited this morning.  She does not feel short of breath.  She denies any urinary symptoms.    Objective:   Past Medical History:    Essential hypertension              Past Surgical History:   Procedure Laterality Date    Appendectomy  2021          Tonsillectomy                  Social History     Socioeconomic History    Marital status: Single   Tobacco Use    Smoking status: Never    Smokeless tobacco: Never   Vaping Use    Vaping status: Never Used   Substance and Sexual Activity    Alcohol use: Yes     Comment: socially    Drug use: Never     Social Determinants of Health     Food Insecurity: No Food Insecurity (2024)    Food Insecurity     Food Insecurity: Never true   Transportation Needs: No Transportation Needs (2024)    Transportation Needs     Lack of Transportation: No   Housing Stability: Low Risk  (2024)    Housing Stability     Housing Instability: No              Review of Systems    Positive for stated Chief Complaint: Abdomen/Flank Pain    Other systems are as noted in HPI.  Constitutional and vital signs reviewed.      All other systems reviewed and negative except as noted above.    Physical Exam     ED Triage Vitals   BP 24 1017 (!) 170/112   Pulse 24 1009 98   Resp 24 1009 18   Temp 24 1009 98 °F (36.7 °C)    Temp src 07/04/24 1749 Oral   SpO2 07/04/24 1009 96 %   O2 Device 07/04/24 1009 None (Room air)       Current Vitals:   Vital Signs  BP: (!) 157/93  Pulse: 72  Resp: 18  Temp: 98.3 °F (36.8 °C)  Temp src: Oral  MAP (mmHg): (!) 107    Oxygen Therapy  SpO2: 100 %  O2 Device: None (Room air)  Pulse Oximetry Type: Intermittent  Oximetry Probe Site Changed: Yes  Pulse Ox Probe Location: Left hand            Physical Exam    General: Alert and oriented x3, appears uncomfortable.  HEENT: Normocephalic, atraumatic, pupils equal round and reactive to light, oropharynx clear, uvula midline.  Neck: Supple.  Cardiovascular: Regular rate and rhythm.  Respiratory: Lungs clear to auscultation.  Abdomen: Soft, tender to palpation across the upper abdomen but greatest in right upper quadrant, no rebound or guarding, normal active bowel sounds, no CVA tenderness.  Extremities: No CCE.  Skin: Warm and dry.    ED Course     Labs Reviewed   URINALYSIS, ROUTINE - Abnormal; Notable for the following components:       Result Value    Urine Color Colorless (*)     All other components within normal limits   COMP METABOLIC PANEL (14) - Abnormal; Notable for the following components:    Glucose 120 (*)     Sodium 133 (*)     AST 11 (*)     Total Protein 8.5 (*)     Globulin  5.0 (*)     A/G Ratio 0.7 (*)     All other components within normal limits   CBC W/ DIFFERENTIAL - Abnormal; Notable for the following components:    HGB 11.4 (*)     MCV 76.5 (*)     MCH 24.2 (*)     All other components within normal limits   CBC W/ DIFFERENTIAL - Abnormal; Notable for the following components:    HGB 10.5 (*)     HCT 34.3 (*)     MCV 77.4 (*)     MCH 23.7 (*)     MCHC 30.6 (*)     All other components within normal limits   LIPASE - Normal   POCT PREGNANCY URINE - Normal   CBC WITH DIFFERENTIAL WITH PLATELET    Narrative:     The following orders were created for panel order CBC With Differential With Platelet.  Procedure                                Abnormality         Status                     ---------                               -----------         ------                     CBC W/ DIFFERENTIAL[[id number]]          Abnormal            Final result                 Please view results for these tests on the individual orders.   CBC WITH DIFFERENTIAL WITH PLATELET    Narrative:     The following orders were created for panel order CBC With Differential With Platelet.  Procedure                               Abnormality         Status                     ---------                               -----------         ------                     CBC W/ DIFFERENTIAL[[id number]]          Abnormal            Final result                 Please view results for these tests on the individual orders.   MAGNESIUM   COMP METABOLIC PANEL (14)   RAINBOW DRAW BLUE     EKG    Rate, intervals and axes as noted on EKG Report.  Rate: 83  Rhythm: Sinus Rhythm  Reading: No acute ischemic abnormality               US ABDOMEN COMPLETE (CPT=76700)    Result Date: 7/4/2024  PROCEDURE:  US ABDOMEN COMPLETE (CPT=76700)  COMPARISON:  None.  INDICATIONS:  abdominal pain, RUQ  TECHNIQUE:  Real time gray-scale ultrasound was used to evaluate the abdomen.  The exam includes images of the liver, gallbladder, common bile duct, pancreas, spleen, kidneys, IVC, and aorta.  PATIENT STATED HISTORY: (As transcribed by Technologist)     FINDINGS:  Exam limited with patient body habitus, difficulty holding breath, limited exam.  No hepatic abnormality seen.  Common bile duct 3.2 mm, without dilation.  Gallstones are with 2.1 cm stone gallbladder neck, with gallbladder dilation, and gallbladder wall thickening 4.5 mm.  Positive Malone sign.  Pancreas obscured by bowel gas.  No splenic or renal abnormality, kidneys right kidney 10.2 cm, left kidney 10.8 cm.  Aorta IVC patent.  No ascites or pleural effusion.             CONCLUSION:  Dilated gallbladder, with positive Malone sign, 2.1 cm stone gallbladder  neck, and gallbladder wall thickening.  Concern for cholecystitis.   LOCATION:  [facility]    Dictated by (CST): Daren Villa MD on 7/04/2024 at 2:51 PM     Finalized by (CST): Daren Villa MD on 7/04/2024 at 2:52 PM        The patient was given Dilaudid with Zofran as needed for pain and did require repeat dosing.  She was given Protonix additionally.  Her blood pressure did improve with pain relief.  She thinks she did vomit her blood pressure medicine this morning as well.  These factors likely explain her hypertension on arrival.       MDM      Patient presents with abdominal pain.  Differential diagnosis includes but is not limited to acute cholecystitis, acute pancreatitis and early gastroenteritis.  The patient does have evidence of acute cholecystitis on imaging.  She does not have a fever or leukocytosis.  Her liver enzymes and lipase have come back within normal limits.  She does have ongoing pain despite medications.  I discussed her case with Dr. Dale from general surgery who will take her to the OR when it is available.  He requested admission to the hospitalist and Dr. Escobar has agreed to admit her primarily.  Patient was updated on the findings and plan of care and is in agreement.  Admission disposition: 7/4/2024  3:56 PM                                        [facility]    Disposition and Plan     Clinical Impression:  1. Acute cholecystitis         Disposition:  Admit  7/4/2024  3:56 pm    Follow-up:  No follow-up provider specified.        Medications Prescribed:  Current Discharge Medication List                            Hospital Problems       Present on Admission  Date Reviewed: 8/23/2021            ICD-10-CM Noted POA    * (Principal) Acute cholecystitis K81.0 7/4/2024 Unknown    Anemia D64.9 7/4/2024 Yes    Hyperglycemia R73.9 7/4/2024 Yes    Hyponatremia E87.1 7/4/2024 Yes

## 2024-07-05 NOTE — CONSULTS
[facility]  Report of Consultation    Cain Boone Patient Status:  Inpatient    1985 MRN [id number]   Location [facility] 3NW-A Attending Dorian Escobar MD   Hosp Day # 0 [facility]     Reason for Consultation:  Concern for acute cholecystitis    History of Present Illness:  Cain Bonoe is a very nice 39-year-old female who presented to the emergency room today with right upper quadrant abdominal pain.  The pain began around 4 AM this morning.  She states it is severe and limited her activity and breathing.  It is located primarily beneath the right breast and radiates toward the right flank.  Pain is associated with nausea and vomiting.  Patient is also felt constipated.  She tried taking over-the-counter antacids and laxatives without relief.  No fevers or jaundice.  Patient last ate ice cream, chips and tacos yesterday night.    Patient was worked up with labs and ultrasound imaging in the emergency room.  Labs were essentially unremarkable.  Ultrasound imaging showed a dilated gallbladder with positive sonographic Malone sign.  There was a 1.2 cm stone in the gallbladder neck and gallbladder wall thickening.  These findings were concerning for cholecystitis.  This prompted general surgery consultation.  Of note, patient is morbidly obese with BMI 58.  Prior abdominal surgery includes laparoscopic appendectomy.  No blood thinners.  Patient works as a .    History:  Past Medical History:    Essential hypertension     Past Surgical History:   Procedure Laterality Date    Appendectomy  2021          Tonsillectomy       No family history on file.   reports that she has never smoked. She has never used smokeless tobacco. She reports current alcohol use. She reports that she does not use drugs.    Allergies:  No Known Allergies    Medications:    Current Facility-Administered Medications:     HYDROmorphone (Dilaudid) 1 MG/ML injection 1 mg, 1 mg, Intravenous,  Q30 Min PRN    amLODIPine (Norvasc) tab 10 mg, 10 mg, Oral, Daily    acetaminophen (Tylenol Extra Strength) tab 1,000 mg, 1,000 mg, Oral, Q4H PRN    melatonin tab 3 mg, 3 mg, Oral, Nightly PRN    glycerin-hypromellose- (Artificial Tears) 0.2-0.2-1 % ophthalmic solution 1 drop, 1 drop, Both Eyes, QID PRN    sodium chloride (Saline Mist) 0.65 % nasal solution 1 spray, 1 spray, Each Nare, Q3H PRN    acetaminophen (Tylenol) tab 650 mg, 650 mg, Oral, Q4H PRN **OR** HYDROcodone-acetaminophen (Norco) 5-325 MG per tab 1 tablet, 1 tablet, Oral, Q4H PRN **OR** HYDROcodone-acetaminophen (Norco) 5-325 MG per tab 2 tablet, 2 tablet, Oral, Q4H PRN    HYDROmorphone (Dilaudid) 1 MG/ML injection 0.2 mg, 0.2 mg, Intravenous, Q2H PRN **OR** HYDROmorphone (Dilaudid) 1 MG/ML injection 0.4 mg, 0.4 mg, Intravenous, Q2H PRN **OR** HYDROmorphone (Dilaudid) 1 MG/ML injection 0.8 mg, 0.8 mg, Intravenous, Q2H PRN    ondansetron (Zofran) 4 MG/2ML injection 4 mg, 4 mg, Intravenous, Q6H PRN    prochlorperazine (Compazine) 10 MG/2ML injection 5 mg, 5 mg, Intravenous, Q8H PRN    polyethylene glycol (PEG 3350) (Miralax) 17 g oral packet 17 g, 17 g, Oral, Daily PRN    sennosides (Senokot) tab 17.2 mg, 17.2 mg, Oral, Nightly PRN    bisacodyl (Dulcolax) 10 MG rectal suppository 10 mg, 10 mg, Rectal, Daily PRN    fleet enema (Fleet) 7-19 GM/118ML rectal enema 133 mL, 1 enema, Rectal, Once PRN    dextrose in lactated ringers 5% infusion, , Intravenous, Continuous    hydrALAzine (Apresoline) 20 mg/mL injection 10 mg, 10 mg, Intravenous, Q6H PRN    labetalol (Trandate) 5 mg/mL injection 10 mg, 10 mg, Intravenous, Q4H PRN    hydrOXYzine (Atarax) tab 10 mg, 10 mg, Oral, TID PRN    diphenhydrAMINE (Benadryl) 50 mg/mL  injection 12.5 mg, 12.5 mg, Intravenous, Q6H PRN    lidocaine-menthol 4-1 % patch 1 patch, 1 patch, Transdermal, Daily PRN    benzocaine-menthol (Cepacol) lozenge 1 lozenge, 1 lozenge, Oral, PRN    simethicone (Mylicon) chewable tab 80  mg, 80 mg, Oral, QID PRN    benzonatate (Tessalon) cap 200 mg, 200 mg, Oral, TID PRN    calcium carbonate (Tums) chewable tab 500 mg, 500 mg, Oral, TID PRN    piperacillin-tazobactam (Zosyn) 3.375 g in dextrose 5% 100 mL IVPB-ADDV, 3.375 g, Intravenous, Q8H    Review of Systems:  A comprehensive 10 point review of systems was completed.  Pertinent positives and negatives noted in the the HPI.    Physical Exam:  Blood pressure (!) 146/93, pulse 77, temperature 97.6 °F (36.4 °C), temperature source Oral, resp. rate 18, height 60\", weight 298 lb (135.2 kg), last menstrual period 06/05/2024, SpO2 100%.    General: Awake, alert, no apparent distress, answering questions appropriately  HEENT: Exam is unremarkable.  Without scleral icterus.  Mucous membranes are moist.  Lungs: Non-labored breathing.  Cardiac: Hemodynamically normal.   Abdomen: Obese, soft, non-distended, tender to palpation in the right upper quadrant  Extremities:  No gross deformities  Neurologic: Cranial nerves are grossly intact.  Motor strength and sensory examination is grossly normal.  No focal neurologic deficit.  Skin: Warm and dry.  Psych: Appropriate mood and affect    Laboratory Data:  Reviewed    Imaging:  I have personally reviewed the imaging of patient's abdominal ultrasound    Impression and Plan:  Patient Active Problem List   Diagnosis    Hyponatremia    Anemia    Hyperglycemia    Acute cholecystitis       Cain Boone is a very nice 39-year-old female who presented to the emergency room today with right upper quadrant abdominal pain.  The pain began around 4 AM this morning.  She states it is severe and limited her activity and breathing.  It is located primarily beneath the right breast and radiates toward the right flank.  Pain is associated with nausea and vomiting.  Patient is also felt constipated.  She tried taking over-the-counter antacids and laxatives without relief.  No fevers or jaundice.  Patient last ate ice cream, chips  and tacos yesterday night.    Patient was worked up with labs and ultrasound imaging in the emergency room.  Labs were essentially unremarkable.  Ultrasound imaging showed a dilated gallbladder with positive sonographic Malone sign.  There was a 1.2 cm stone in the gallbladder neck and gallbladder wall thickening.  These findings were concerning for cholecystitis.  This prompted general surgery consultation.  Of note, patient is morbidly obese with BMI 58.  Prior abdominal surgery includes laparoscopic appendectomy.  No blood thinners.  Patient works as a .    Patient is generally well-appearing on exam.  She is afebrile and hemodynamically normal.  She does have exquisite right upper quadrant tenderness.  Overall, her clinical picture appears consistent with acute cholecystitis.    Recommendations:  I recommend proceeding urgently to the operating room for laparoscopic cholecystectomy with intraoperative cholangiogram, possible open.  The details of the procedure were discussed including the expected recovery time, risks, benefits and alternatives.  Patient and family expressed understanding and were agreeable to surgery.  Patient has been added onto my operative schedule for tomorrow given lack of OR availability until midnight or later.      In the meantime, patient can have clear liquid diet.  N.p.o. after midnight.  Okay for pain and nausea medication as needed.  Antibiotics started.  Okay for DVT prophylaxis.  Appreciate hospitalist service input.     Postoperative disposition to be determined pending surgical findings and intraoperative course.  Hopeful for discharge same day of surgery versus next day.     Alirio Dale MD  [facility] General Surgery      Alirio Dale MD  7/4/2024  10:04 PM

## 2024-07-05 NOTE — OPERATIVE REPORT
[facility]  Operative Note    Cain Boone Location: OR   CSN [id number] MRN [id number]    1985 Age 39 year old   Admission Date 2024 Operation Date 2024   Attending Physician Dorian Escobar MD Operating Physician Alirio Dale MD   PCP BRET VALENCIA          Patient Name: Cain Boone    Preoperative Diagnosis: Cholecystitis [K81.9]    Postoperative Diagnosis: Acute cholecystitis    Primary Surgeon: Alirio Dale MD    Assistant: Natividad Serrato PA-C (her assistance was essential for the safe conduct of this case especially in performance of the intraoperative cholangiogram and aiding in dissection in Calot's triangle)    Anesthesia: General    Procedures: Laparoscopic cholecystectomy with intraoperative cholangiogram    Implants: None    Specimen: Gallbladder    Drains: None    Estimated Blood Loss: 10 cc    Complications: None immediate    Condition: Stable    Indications for Surgery:   Cain Boone is a very nice 39-year-old female who presented to the emergency room on 2024 with right upper quadrant abdominal pain.  The pain began around 4 AM this morning.  She states it is severe and limited her activity and breathing.  It is located primarily beneath the right breast and radiates toward the right flank.  Pain is associated with nausea and vomiting.  Patient is also felt constipated.  She tried taking over-the-counter antacids and laxatives without relief.  No fevers or jaundice.  Patient last ate ice cream, chips and tacos night prior to symptoms.     Patient was worked up with labs and ultrasound imaging in the emergency room.  Labs were essentially unremarkable.  Ultrasound imaging showed a dilated gallbladder with positive sonographic Malone sign.  There was a 1.2 cm stone in the gallbladder neck and gallbladder wall thickening.  These findings were concerning for cholecystitis.  This prompted general surgery consultation.  Of note, patient is morbidly obese with BMI 58.  Prior  abdominal surgery includes laparoscopic appendectomy.  No blood thinners.  Patient works as a .     Patient is generally well-appearing on exam.  She is afebrile and hemodynamically normal.  She does have exquisite right upper quadrant tenderness.  Overall, her clinical picture appears consistent with acute cholecystitis.     Recommendations:  I recommend proceeding urgently to the operating room for laparoscopic cholecystectomy with intraoperative cholangiogram, possible open.  The details of the procedure were discussed including the expected recovery time, risks, benefits and alternatives.  Patient and family expressed understanding and were agreeable to surgery.  Patient has been added onto my operative schedule for today.  Consent was signed.  All questions answered.    Surgical Findings:   Distended gallbladder with pericholecystic edema consistent with acute cholecystitis.  Large approximately 2-3 cm gallstone palpated within the gallbladder.    Description of Procedure:   Patient was brought to the operating room and positioned supine on a pink foam pad.  Bilateral sequential compression devices were placed.  Preoperative antibiotics were given.  Patient was induced under general endotracheal anesthesia.  The abdomen was prepped and draped in the usual sterile fashion.  A timeout was performed.     A 12 mm transverse skin incision was made just above the umbilicus. Pneumoperitoneum was established using a Veress needle at this site.  There was appropriately low opening pressure.  The Veress needle was removed and a 12 mm optical trocar was inserted into the peritoneal cavity under direct laparoscopic vision. There was no evidence of underlying visceral injury.  A 5 mm epigastric trocar was placed under direct vision.  Patient was positioned in reverse Trendelenburg and tilted with right side up.  Two additional 5 mm trocars were placed under direct vision beneath the right costal margin.      I identified the fundus of the gallbladder beneath the right lobe of the liver. The gallbladder fundus was retracted cephalad over the liver.  I began by scoring the peritoneum overlying the neck and infundibulum of the gallbladder using hook electrocautery.  The peritoneum and fatty-fibrotic tissue overlying the neck and infundibulum of the gallbladder was dissected off using a combination of sharp and blunt dissection with hook electrocautery and a [place] dissector. Eventually, I was able to establish a critical view of safety.     The cystic artery was ligated between metal clips and divided, two on the stay side and one on the specimen side.  The cystic duct was ligated close to its exit from the gallbladder using a metal clip.  The cystic duct was then partially divided and a cholangiocatheter was passed into the cystic duct over a wire under direct vision.  The catheter was secured in place at the cystic duct and a cholangiogram was performed showing rapid filling of the common bile duct, intrahepatic ducts, and duodenum without filling defects. The cholangiocatheter was removed and the stay portion of the cystic duct was ligated using 2 metal clips.  The cystic duct was then fully divided.     The remaining attachments between the gallbladder and liver bed were divided using hook electrocautery. Any remaining small bleeding vessels encountered during this dissection were controlled with electrocautery. The gallbladder was placed in a specimen bag to be retrieved through the periumbilical port. The surgical field was copiously irrigated and suctioned.  The surgical field and liver bed appeared hemostatic.     The patient was leveled.  The fascia at the extraction site was widened with a curved 6 hemostat.  The gallbladder was successfully retrieved intact within the specimen bag.  The fascia at the extraction site was closed using an interrupted 0 PDS suture with the assistance of a Moiz-Cheko device.   The remaining trocars were removed under direct vision and appeared hemostatic. Pneumoperitoneum was evacuated.  Each skin incision was anesthetized using a total of 30 cc of 0.25% Marcaine.  Each skin incision was closed using 4-0 Monocryl in a subcuticular fashion.  The skin was cleaned and dried and skin glue was placed over each incision as a final dressing.     The patient was awakened from anesthesia, extubated and transported to the postanesthesia care unit in stable condition.  All sponge, needle and instrument counts were correct at the end of the case.  I was present for the entire case.      Alirio Dale MD  7/5/2024  11:09 AM

## 2024-07-05 NOTE — DISCHARGE INSTRUCTIONS
Post-Surgical Discharge Instructions    Alirio Dale MD    Pain: You may take acetaminophen (Tylenol) up to 650 mg every 6 hours as needed for mild-moderate pain. You may take ibuprofen (Motrin) up to 600 mg every 6 hours as needed for mild-moderate pain. Take narcotic pain medication as needed for sever pain per your prescription. Do not drive while taking narcotic pain medication. Many patients take a stool softener as narcotics are known to cause constipation. Okay to use ice packs over abdomen    Diet:  No restrictions.    Activity:  No heavy lifting greater than 10 lbs and no exercising for a total of 6 weeks from the date of surgery.  You may walk and climb stairs with moderation. If your abdomen is more uncomfortable than the day before, you need to be less active as you may be pulling on your stitches.    Bathing:  You may shower as often as you would like, but no submerging the incisions under water for a total of 2 weeks from the date of surgery.  This includes no swimming, no baths, and no hot-tubs.      Wound:  Keep the wound dry.  No dressing is necessary unless there is drainage coming from the wound.  If the drainage is excessive or looks like pus, please call our office.    Driving: You may drive provided that you are no longer taking your narcotic pain medication.      Bowel Function:  After surgery, your bowel movements will be irregular.  It is normal to have up to 3 bowel movements a day or as low as 1 bowel movement every 3 days.  Occasionally, your movements may be even more irregular than this.  As long as you are not vomiting or have a fever over 100.3, you don’t need to be overly concerned.      Return to Work:  Most patients return to work after 1-2 weeks from the date of their surgery.  You will still have a lifting restriction of no greater than 10 lbs from the date of your surgery until 6 weeks.  If your work requires heavy lifting, you will need to stay off work for 6 weeks.  When  you are ready to return to work, please call the office and we will send a work release to your employer.      Appointment: Please call our office for an appointment in 10-14 days after surgery, unless otherwise instructed.  This will allow ample time for the swelling and soreness to resolve before your wound is examined. If you have fevers, chills, or if you are concerned about your wound, please call us immediately at [phone].      Thank you for entrusting us with your care.

## 2024-07-05 NOTE — ANESTHESIA POSTPROCEDURE EVALUATION
[facility]    [facility] Patient Status:  Inpatient   Age/Gender 39 year old female MRN [id number]   Location [facility] SURGERY Attending Dorian Escobar MD   Hosp Day # 1 PCP BRET VALENCIA       Anesthesia Post-op Note    LAPAROSCOPIC CHOLECYSTECTOMY WITH CHOLANGIOGRAM    Procedure Summary       Date: 07/05/24 Room / Location:  MAIN OR 09 / [facility] MAIN OR    Anesthesia Start: 0915 Anesthesia Stop: 1111    Procedure: LAPAROSCOPIC CHOLECYSTECTOMY WITH CHOLANGIOGRAM (Abdomen) Diagnosis:       Cholecystitis      (Cholecystitis [K81.9])    Surgeons: Alirio Dale MD Anesthesiologist: Ross Avitia MD    Anesthesia Type: general ASA Status: 3            Anesthesia Type: general    Vitals Value Taken Time   /100 07/05/24 1111   Temp 99.1 07/05/24 1111   Pulse 93 07/05/24 1111   Resp 20 07/05/24 1111   SpO2 100 07/05/24 1111       Patient Location: PACU    Anesthesia Type: general    Airway Patency: patent and extubated    Postop Pain Control: adequate    Mental Status: preanesthetic baseline    Nausea/Vomiting: none    Cardiopulmonary/Hydration status: stable euvolemic    Complications: no apparent anesthesia related complications    Postop vital signs: stable    Dental Exam: Unchanged from Preop    Patient to be discharged from PACU when criteria met.

## 2024-07-05 NOTE — ANESTHESIA PROCEDURE NOTES
Airway  Date/Time: 7/5/2024 9:21 AM  Urgency: elective    Airway not difficult    General Information and Staff    Patient location during procedure: OR  Anesthesiologist: Ross Avitia MD  Resident/CRNA: Byron Vo CRNA  Performed: CRNA   Performed by: Byron Vo CRNA  Authorized by: Ross Avitia MD      Indications and Patient Condition  Indications for airway management: anesthesia  Spontaneous Ventilation: absent  Sedation level: deep  Preoxygenated: yes  Patient position: sniffing  MILS maintained throughout  Mask difficulty assessment: 0 - not attempted    Final Airway Details  Final airway type: endotracheal airway      Successful airway: ETT  Cuffed: yes   Successful intubation technique: Video laryngoscopy  Facilitating devices/methods: intubating stylet and rapid sequence intubation  Endotracheal tube insertion site: oral  Blade: GlideScope  Blade size: #3  ETT size (mm): 7.5    Placement verified by: capnometry   Cuff volume (mL): 7  Measured from: lips  ETT to lips (cm): 21  Number of attempts at approach: 1  Number of other approaches attempted: 0    Additional Comments  Dentition per pre op

## 2024-07-05 NOTE — PLAN OF CARE
Pt vitals stable, A&O x4. Pt denied chest pain and shortness of breath. Stated having abdominal pain, PRN meds given. IVF infusing. Tolerated clear liquids prior to midnight, has been NPO since for procedure. Continuous pulse ox and telemetry in place. Bed locked in low position, call light in reach, rounding provided.

## 2024-07-06 VITALS
SYSTOLIC BLOOD PRESSURE: 139 MMHG | HEART RATE: 67 BPM | OXYGEN SATURATION: 100 % | RESPIRATION RATE: 18 BRPM | HEIGHT: 60 IN | TEMPERATURE: 99 F | DIASTOLIC BLOOD PRESSURE: 80 MMHG | BODY MASS INDEX: 57.52 KG/M2 | WEIGHT: 293 LBS

## 2024-07-06 PROCEDURE — 99239 HOSP IP/OBS DSCHRG MGMT >30: CPT | Performed by: HOSPITALIST

## 2024-07-06 RX ORDER — POLYETHYLENE GLYCOL 3350 17 G/17G
17 POWDER, FOR SOLUTION ORAL DAILY
Status: DISCONTINUED | OUTPATIENT
Start: 2024-07-06 | End: 2024-07-06

## 2024-07-06 RX ORDER — DOCUSATE SODIUM 100 MG/1
100 CAPSULE, LIQUID FILLED ORAL 2 TIMES DAILY
Status: DISCONTINUED | OUTPATIENT
Start: 2024-07-06 | End: 2024-07-06

## 2024-07-06 NOTE — PROGRESS NOTES
[facility]   part of [facility]     Hospitalist Progress Note     Cain Boone Patient Status:  Inpatient    1985 MRN [id number]   Location [facility] 3NW-A Attending Dorian Escobar MD   Hosp Day # 2 PCP BRET VALENCIA     Chief Complaint: abd pain    Subjective:     Patient had lap vera today.  Just got back from surgery.  Feels ok.      Objective:    Review of Systems:   ROS completed; pertinent positive and negatives stated in subjective.    Vital signs:  Temp:  [97.6 °F (36.4 °C)-99.1 °F (37.3 °C)] 98.2 °F (36.8 °C)  Pulse:  [58-96] 58  Resp:  [13-31] 16  BP: (154-188)/() 167/92  SpO2:  [94 %-100 %] 95 %  FiO2 (%):  [40 %] 40 %    Physical Exam:    General: No acute distress  Respiratory: Clear to auscultation bilaterally  Cardiovascular: S1, S2.  Abdomen: Soft  Neuro: No new focal deficits      Diagnostic Data:    Labs:  Recent Labs   Lab 24  1034 24  0604   WBC 7.5 7.8   HGB 11.4* 10.5*   MCV 76.5* 77.4*   .0 323.0       Recent Labs   Lab 24  1034 24  0604   * 109*   BUN 9 8*   CREATSERUM 0.83 0.97   CA 9.6 8.9   ALB 3.5 3.0*   * 134*   K 3.5 3.6   CL 99 100   CO2 30.0 28.0   ALKPHO 52 45   AST 11* 9*   ALT 21 19   BILT 0.3 0.3   TP 8.5* 7.4       Estimated Creatinine Clearance: 56.5 mL/min (based on SCr of 0.97 mg/dL).     No results for input(s): \"TROP\", \"TROPHS\", \"CK\" in the last 168 hours.    No results for input(s): \"PTP\", \"INR\" in the last 168 hours.           Imaging: Imaging data reviewed in Epic.    Medications:    carvedilol  6.25 mg Oral BID with meals    amLODIPine  10 mg Oral Daily    piperacillin-tazobactam  3.375 g Intravenous Q8H       Assessment & Plan:     #Acute cholecystitis  S/p lap vera w/ neg IOC today  Pain control    #HTN  Elevated with pain  Norvasc  PRN IV meds ordered     #Morbid Obesity  Body mass index is 58.2 kg/m².          Dispo: discussed with surgery.  Lap vera today.  Cont. Pain control      Dorian  MD Luz    Supplementary Documentation:   D(C1+C3) + R(surgery with high risk )  Quality:    DVT Mechanical Prophylaxis:   SCDs, Early ambuation  DVT Pharmacologic Prophylaxis   Medication   None                Code Status: Not on file  Amos: No urinary catheter in place  Amos Duration (in days):   Central line:    ROCIO: 7/5/2024      Discharge is dependent on: clinical stability  At this point Ms. Boone is expected to be discharge to: home    The 21st Century Cures Act makes medical notes like these available to patients in the interest of transparency. Please be advised this is a medical document. Medical documents are intended to carry relevant information, facts as evident, and the clinical opinion of the practitioner. The medical note is intended as peer to peer communication and may appear blunt or direct. It is written in medical language and may contain abbreviations or verbiage that are unfamiliar.

## 2024-07-06 NOTE — PLAN OF CARE
Pt A&Ox4, resting in bed.  Resp: RA; pt uses CPAP at home, but did not want RT to evaluate her for in-house DME or for her friend to go home to retrieve home unit. Pt reports being able to sleep comfortably enough for a night or 2 with just a NC.  GI/: voids normally  Activity/Safety: up w/asst  Skin: intact  Pain: Dilaudid given  Pt and fmaily updated on and agreeable to POC; NPO, IVF for lap vera today.

## 2024-07-06 NOTE — PROGRESS NOTES
A&Ox4. VSS. RA. .  GI: Abdomen soft, nondistended. Denies passing gas.  Denies nausea.  : Voids.  Pain controlled with PRN Dilaudid- plan to switch PO Norco in AM   Up with standby assist.  Incisions:4 lap sites w/ skin glue- C/D/I   Diet: Tolerating soft diet   All appropriate safety measures in place. All questions and concerns addressed.

## 2024-07-06 NOTE — PROGRESS NOTES
[facility]  Progress Note    Cain Boone Patient Status:  Inpatient    1985 MRN [id number]   Location [facility] 3NW-A Attending Dorian Escobar MD   Hosp Day # 2 PCP BRET VALENCIA     Subjective:  The patient is resting comfortably in bed with family at bedside.  She reports incisional/abdominal soreness.  She denies nausea or vomiting.  She denies passing flatus or having a bowel movement.  She tolerated a clear liquid diet and states she will order a soft diet for breakfast later.    Objective/Physical Exam:  General: Alert, orientated x3.  Cooperative.  No apparent distress.  Vital Signs:  Blood pressure (!) 167/92, pulse 58, temperature 98.2 °F (36.8 °C), temperature source Oral, resp. rate 16, height 60\", weight 298 lb (135.2 kg), last menstrual period 2024, SpO2 95%.  HEENT: Normocephalic, atraumatic. No scleral icterus.  Abdomen:  Soft, non-distended, appropriately tender, with no rebound or guarding.  No peritoneal signs.  Incision: Dry, clean, and intact with glue in place. No surrounding erythema or drainage. No signs of infection.      Labs:  CBC:    Lab Results   Component Value Date    WBC 7.8 2024    WBC 7.5 2024    WBC 8.7 2023     Lab Results   Component Value Date    HGB 10.5 (L) 2024    HGB 11.4 (L) 2024    HGB 12.0 2023      Lab Results   Component Value Date    .0 2024    .0 2024    .0 2023       Assessment/Plan:  Patient Active Problem List   Diagnosis    Hyponatremia    Anemia    Hyperglycemia    Acute cholecystitis     POD 1 laparoscopic cholecystectomy    The patient is stable for discharge home from a general surgery standpoint.  Continue diet as tolerated.  Discharge instructions discussed with the patient.  She is to [place] [facility] [place] 2 weeks.    YUSUF Sinha  2024  8:07 AM

## 2024-07-06 NOTE — PLAN OF CARE
Pt A&Ox4, resting in bed.  Resp: RA  GI/: voiding normally  Activity/Safety: up w/asst  Skin: abd lap sites x4 w/glue, c/d/i  Pain: Norco given  Pt and family updated on and agreeable to POC; pain control.    1610: pt discharged to home via wheelchair to [facility] with mother and friend. IV removed. AVS reviewed with pt and all questions answered. All personal belongings returned to pt.

## 2024-07-08 NOTE — PAYOR COMM NOTE
Payor: MARA  Subscriber #:  [id number]  Authorization Number: [id number]    Admit date: 7/4/24  Admit time:   4:38 PM  Discharge Date: 7/6/2024  4:09 PM      Due to a change in the clinic's schedule, the appointment with Dr. Louise on 12/28/22 needs to be rescheduled. A voicemail was left with the clinic number for them to call back.

## 2024-07-11 NOTE — DISCHARGE SUMMARY
[facility]     Cain Boone Patient Status:  Inpatient    1985 MRN [id number]   Location [facility] 3NW-A Attending No att. providers found   Hosp Day # 2 PCP BRET VALENCIA     Date of Admission:  2024  Date of Discharge:   2024    Discharge Disposition: Home or Self Care    Discharge Diagnosis:    #Acute cholecystitis    #HTN    #Morbid Obesity  Body mass index is 58.2 kg/m².          History of Present Illness:    Cain Boone is a 39 year old female with a past medical history of HTN.  She comes to the ED due to abdominal pain which started earlier today.  Pain over most of her abdomen but mostly in RUQ.  US done with signs of acute cholecystitis.  BP elevated in ED when pain was high. Better now after IV narcotics.           Brief Synopsis:    The patient was admitted due to acute cholecystitis.  She had lap evra.  She remained stable afterward and was discharged home.  F/u with PCP and srugery    All diagnosis' and recommendations discussed with patient and/or family in detail.      Lace+ Score: 18  59-90 High Risk  29-58 Medium Risk  0-28   Low Risk       TCM Follow-Up Recommendation:  LACE < 29: Low Risk of readmission after discharge from the hospital. No TCM follow-up needed.    Procedures during hospitalization:   Thomas gamez      Consultants:  surgery    Discharge Medication List:     Discharge Medications        START taking these medications        Instructions Prescription details   HYDROcodone-acetaminophen 5-325 MG Tabs  Commonly known as: Norco      Take 1 tablet by mouth every 6 (six) hours as needed for Pain.   Quantity: 15 tablet  Refills: 0            CHANGE how you take these medications        Instructions Prescription details   carvedilol 6.25 MG Tabs  Commonly known as: Coreg  What changed: when to take this      Take 1 tablet (6.25 mg total) by mouth 2 (two) times daily with meals.   Refills: 0            CONTINUE taking these medications         Instructions Prescription details   hydroCHLOROthiazide 25 MG Tabs      Take 1 tablet (25 mg total) by mouth daily.   Refills: 0            STOP taking these medications      amLODIPine 10 MG Tabs  Commonly known as: Norvasc        guaiFENesin-codeine 100-10 MG/5ML Soln  Commonly known as: Robitussin AC        oxyCODONE 5 MG Tabs        predniSONE 50 MG Tabs  Commonly known as: Deltasone                  Where to Get Your Medications        These medications were sent to [facility] - [address], [address] [phone], [phone]  [address], [address], [place] [facility] [address]      Phone: [phone]   HYDROcodone-acetaminophen 5-325 MG Tabs         [facility]    Follow-up appointment:   [facility]   [place] [id number] [phone]    Schedule an appointment as soon as possible for a visit in 2 week(s)      Alirio Dale MD   [place] [id number] [phone]    Schedule an appointment as soon as possible for a visit  As needed      Vital signs:       Physical Exam:    General: No acute distress   Lungs: clear to auscultation  Cardiovascular: S1, S2  Abdomen: Soft      -----------------------------------------------------------------------------------------------  PATIENT DISCHARGE INSTRUCTIONS: See electronic chart    Dorian Escobar MD    Total minutes spent on discharge plannin      The  Century Cures Act makes medical notes like these available to patients in the interest of transparency. Please be advised this is a medical document. Medical documents are intended to carry relevant information, facts as evident, and the clinical opinion of the practitioner. The medical note is intended as peer to peer communication and may appear blunt or direct. It is written in medical language and may contain abbreviations or verbiage that are unfamiliar.

## 2024-07-25 ENCOUNTER — OFFICE VISIT (OUTPATIENT)
Facility: LOCATION | Age: 39
End: 2024-07-25
Payer: MEDICAID

## 2024-07-25 VITALS — HEART RATE: 77 BPM | OXYGEN SATURATION: 96 % | TEMPERATURE: 98 F

## 2024-07-25 DIAGNOSIS — Z98.890 POSTOPERATIVE STATE: Primary | ICD-10-CM

## 2024-07-25 DIAGNOSIS — Z90.49 HISTORY OF CHOLECYSTECTOMY: ICD-10-CM

## 2024-07-25 PROCEDURE — 99024 POSTOP FOLLOW-UP VISIT: CPT

## 2024-07-25 RX ORDER — KETOCONAZOLE 20 MG/ML
SHAMPOO TOPICAL
COMMUNITY
Start: 2024-01-11

## 2024-07-25 RX ORDER — IBUPROFEN 600 MG/1
600 TABLET ORAL
COMMUNITY
Start: 2024-07-12

## 2024-07-25 RX ORDER — METHOCARBAMOL 500 MG/1
TABLET, FILM COATED ORAL
COMMUNITY
Start: 2024-04-29

## 2024-07-25 RX ORDER — CLOBETASOL PROPIONATE 0.5 MG/ML
SOLUTION TOPICAL
COMMUNITY

## 2024-07-25 NOTE — PROGRESS NOTES
Follow Up Visit Note       Active Problems      1. Postoperative state    2. History of cholecystectomy          Chief Complaint   Chief Complaint   Patient presents with    Post-Op     PO - LAPAROSCOPIC CHOLECYSTECTOMY WITH CHOLANGIOGRAM 24 JJS, soreness, slight pain, no there symptoms. Patient was told Dr. Meyer would be pulled into the room. Pt needs a letter for physical therapy.         History of Present Illness  Cain is a 39 year old female who underwent laparoscopic cholecystectomy with Dr. Dale on 2024. She presents to clinic today for follow up evaluation.    She reports overall doing well postoperatively. She reports abdominal soreness that is improving with time.  She reports umbilical incision is most tender. She is taking tylenol and ibuprofen as needed for pain control. She reports occasional nausea with eating but denies vomiting. She reports urgency to have a bowel movement after eating. She denies urinary symptoms. She denies fever or chills.     She is requesting a note to return to physical therapy.    Specimen pathology as below:  Gallbladder, cholecystectomy:  -Chronic cholecystitis with cholelithiasis.    Allergies  Cain has No Known Allergies.    Past Medical / Surgical / Social / Family History    The past medical and past surgical history have been reviewed by me today.    Past Medical History:    Essential hypertension     Past Surgical History:   Procedure Laterality Date    Appendectomy  2021          Tonsillectomy         The family history and social history have been reviewed by me today.    History reviewed. No pertinent family history.  Social History     Socioeconomic History    Marital status: Single   Tobacco Use    Smoking status: Never    Smokeless tobacco: Never   Vaping Use    Vaping status: Never Used   Substance and Sexual Activity    Alcohol use: Yes     Comment: socially    Drug use: Never        Current Outpatient Medications:      ibuprofen 600 MG Oral Tab, Take 1 tablet (600 mg total) by mouth., Disp: , Rfl:     ketoconazole 2 % External Shampoo, Apply topically., Disp: , Rfl:     methocarbamol 500 MG Oral Tab, , Disp: , Rfl:     clobetasol 0.05 % External Solution, APPLY TOPICALLY TO THE SCALP TWICE DAILY. DO NOT WASH OFF, Disp: , Rfl:     carvedilol 6.25 MG Oral Tab, Take 1 tablet (6.25 mg total) by mouth 2 (two) times daily with meals., Disp: , Rfl:     hydrochlorothiazide 25 MG Oral Tab, Take 1 tablet (25 mg total) by mouth daily., Disp: , Rfl:      Review of Systems  The Review of Systems has been reviewed by me during today.  Review of Systems   Constitutional:  Negative for appetite change, chills, diaphoresis, fatigue and fever.   Gastrointestinal:  Positive for diarrhea and nausea. Negative for abdominal distention, abdominal pain, anal bleeding, blood in stool, constipation and vomiting.   Genitourinary:  Negative for difficulty urinating, dysuria, hematuria and urgency.   Skin:  Negative for rash and wound.        Physical Findings   Pulse 77   Temp 97.7 °F (36.5 °C) (Temporal)   LMP 07/11/2024 (Exact Date)   SpO2 96%   Physical Exam  Vitals reviewed.   Constitutional:       General: She is not in acute distress.     Appearance: Normal appearance. She is not ill-appearing.   HENT:      Head: Normocephalic and atraumatic.   Eyes:      General: No scleral icterus.     Conjunctiva/sclera: Conjunctivae normal.   Pulmonary:      Effort: Pulmonary effort is normal. No respiratory distress.   Abdominal:      General: There is no distension.      Palpations: Abdomen is soft.      Tenderness: There is no abdominal tenderness. There is no guarding or rebound.      Comments: Laparoscopic incision x 4 are clean, dry and healing appropriately. No surrounding erythema or drainage. No fluctuance to palpation. No signs of infection. Scabbing noted to epigastric incision.    Musculoskeletal:      Cervical back: Normal range of motion.   Skin:      General: Skin is warm and dry.      Coloration: Skin is not jaundiced.   Neurological:      Mental Status: She is alert.   Psychiatric:         Mood and Affect: Mood normal.         Behavior: Behavior normal.          Assessment   1. Postoperative state    2. History of cholecystectomy        Plan   The patient is doing well postoperatively.  Continue Diet as tolerated.   Tylenol and Ibuprofen as needed for pain control. She can apply cold or warm compresses for comfort.   Continue local wound care, soap and water to the incisions. Okay to submerge her incisions. I did discuss with her that the umbilical incision is commonly the most painful as this is the incision that that gallbladder is removed from.    I discussed with the patient that urgency and loose stools can occurs after gallbladder surgery and should improve with time. I instructed her to continue to monitor her symptoms and if they persist by 6- 8 weeks, then she can return to clinic for follow up evaluation.   Pathology discussed with the patient.   Recommend Lifting restrictions of no greater than 15-20 lbs for 6 weeks postoperatively  All the patient's questions and concerns were addressed. They voiced understanding and are in agreement with the plan     Follow Up  They may follow up with [facility] general surgery on an as-needed basis.      Arin Guo PA-C

## 2025-08-13 ENCOUNTER — HOSPITAL ENCOUNTER (EMERGENCY)
Facility: HOSPITAL | Age: 40
Discharge: HOME | End: 2025-08-14
Attending: EMERGENCY MEDICINE

## 2025-08-13 ENCOUNTER — APPOINTMENT (OUTPATIENT)
Dept: CT IMAGING | Facility: HOSPITAL | Age: 40
End: 2025-08-13
Attending: EMERGENCY MEDICINE

## 2025-08-13 DIAGNOSIS — N23 RENAL COLIC ON LEFT SIDE: Primary | ICD-10-CM

## 2025-08-13 LAB
ALBUMIN SERPL-MCNC: 4.3 G/DL (ref 3.2–4.8)
ALBUMIN/GLOB SERPL: 1.3 (ref 1–2)
ALP LIVER SERPL-CCNC: 48 U/L (ref 37–98)
ALT SERPL-CCNC: 13 U/L (ref 10–49)
ANION GAP SERPL CALC-SCNC: 13 MMOL/L (ref 0–18)
AST SERPL-CCNC: 15 U/L (ref ?–34)
B-HCG UR QL: NEGATIVE
B-HCG UR QL: NEGATIVE
BASOPHILS # BLD AUTO: 0.03 X10(3) UL (ref 0–0.2)
BASOPHILS NFR BLD AUTO: 0.4 %
BILIRUB SERPL-MCNC: 0.3 MG/DL (ref 0.3–1.2)
BILIRUB UR QL STRIP.AUTO: NEGATIVE
BUN BLD-MCNC: 5 MG/DL (ref 9–23)
CALCIUM BLD-MCNC: 9.3 MG/DL (ref 8.7–10.6)
CHLORIDE SERPL-SCNC: 100 MMOL/L (ref 98–112)
CLARITY UR REFRACT.AUTO: CLEAR
CO2 SERPL-SCNC: 23 MMOL/L (ref 21–32)
COLOR UR AUTO: COLORLESS
CREAT BLD-MCNC: 0.96 MG/DL (ref 0.55–1.02)
EGFRCR SERPLBLD CKD-EPI 2021: 77 ML/MIN/1.73M2 (ref 60–?)
EOSINOPHIL # BLD AUTO: 0.19 X10(3) UL (ref 0–0.7)
EOSINOPHIL NFR BLD AUTO: 2.4 %
ERYTHROCYTE [DISTWIDTH] IN BLOOD BY AUTOMATED COUNT: 19.7 %
GLOBULIN PLAS-MCNC: 3.4 G/DL (ref 2–3.5)
GLUCOSE BLD-MCNC: 93 MG/DL (ref 70–99)
GLUCOSE UR STRIP.AUTO-MCNC: NORMAL MG/DL
HCT VFR BLD AUTO: 35 % (ref 35–48)
HGB BLD-MCNC: 10.4 G/DL (ref 12–16)
IMM GRANULOCYTES # BLD AUTO: 0.01 X10(3) UL (ref 0–1)
IMM GRANULOCYTES NFR BLD: 0.1 %
KETONES UR STRIP.AUTO-MCNC: NEGATIVE MG/DL
LEUKOCYTE ESTERASE UR QL STRIP.AUTO: NEGATIVE
LYMPHOCYTES # BLD AUTO: 2.6 X10(3) UL (ref 1–4)
LYMPHOCYTES NFR BLD AUTO: 33.2 %
MCH RBC QN AUTO: 21.8 PG (ref 26–34)
MCHC RBC AUTO-ENTMCNC: 29.7 G/DL (ref 31–37)
MCV RBC AUTO: 73.4 FL (ref 80–100)
MONOCYTES # BLD AUTO: 0.79 X10(3) UL (ref 0.1–1)
MONOCYTES NFR BLD AUTO: 10.1 %
NEUTROPHILS # BLD AUTO: 4.22 X10 (3) UL (ref 1.5–7.7)
NEUTROPHILS # BLD AUTO: 4.22 X10(3) UL (ref 1.5–7.7)
NEUTROPHILS NFR BLD AUTO: 53.8 %
NITRITE UR QL STRIP.AUTO: NEGATIVE
OSMOLALITY SERPL CALC.SUM OF ELEC: 279 MOSM/KG (ref 275–295)
PH UR STRIP.AUTO: 6 (ref 5–8)
PLATELET # BLD AUTO: 441 10(3)UL (ref 150–450)
POTASSIUM SERPL-SCNC: 3.8 MMOL/L (ref 3.5–5.1)
PROT SERPL-MCNC: 7.7 G/DL (ref 5.7–8.2)
PROT UR STRIP.AUTO-MCNC: NEGATIVE MG/DL
RBC # BLD AUTO: 4.77 X10(6)UL (ref 3.8–5.3)
SODIUM SERPL-SCNC: 136 MMOL/L (ref 136–145)
SP GR UR STRIP.AUTO: 1.01 (ref 1–1.03)
UROBILINOGEN UR STRIP.AUTO-MCNC: NORMAL MG/DL
WBC # BLD AUTO: 7.8 X10(3) UL (ref 4–11)

## 2025-08-13 PROCEDURE — 80053 COMPREHEN METABOLIC PANEL: CPT | Performed by: EMERGENCY MEDICINE

## 2025-08-13 PROCEDURE — 99284 EMERGENCY DEPT VISIT MOD MDM: CPT

## 2025-08-13 PROCEDURE — 80053 COMPREHEN METABOLIC PANEL: CPT

## 2025-08-13 PROCEDURE — 74177 CT ABD & PELVIS W/CONTRAST: CPT | Performed by: EMERGENCY MEDICINE

## 2025-08-13 PROCEDURE — 81001 URINALYSIS AUTO W/SCOPE: CPT | Performed by: EMERGENCY MEDICINE

## 2025-08-13 PROCEDURE — 85025 COMPLETE CBC W/AUTO DIFF WBC: CPT | Performed by: EMERGENCY MEDICINE

## 2025-08-13 PROCEDURE — 96374 THER/PROPH/DIAG INJ IV PUSH: CPT

## 2025-08-13 PROCEDURE — 99285 EMERGENCY DEPT VISIT HI MDM: CPT

## 2025-08-13 PROCEDURE — 85025 COMPLETE CBC W/AUTO DIFF WBC: CPT

## 2025-08-13 PROCEDURE — 81025 URINE PREGNANCY TEST: CPT

## 2025-08-14 VITALS
BODY MASS INDEX: 57.52 KG/M2 | HEART RATE: 77 BPM | HEIGHT: 60 IN | RESPIRATION RATE: 16 BRPM | TEMPERATURE: 98 F | SYSTOLIC BLOOD PRESSURE: 159 MMHG | OXYGEN SATURATION: 100 % | DIASTOLIC BLOOD PRESSURE: 87 MMHG | WEIGHT: 293 LBS

## 2025-08-14 RX ORDER — CEPHALEXIN 500 MG/1
500 CAPSULE ORAL 3 TIMES DAILY
Qty: 15 CAPSULE | Refills: 0 | Status: SHIPPED | OUTPATIENT
Start: 2025-08-14 | End: 2025-08-19

## 2025-08-14 RX ORDER — HYDROCODONE BITARTRATE AND ACETAMINOPHEN 5; 325 MG/1; MG/1
1 TABLET ORAL ONCE
Refills: 0 | Status: COMPLETED | OUTPATIENT
Start: 2025-08-14 | End: 2025-08-14

## 2025-08-14 RX ORDER — IOPAMIDOL 755 MG/ML
100 INJECTION, SOLUTION INTRAVASCULAR
Status: COMPLETED | OUTPATIENT
Start: 2025-08-14 | End: 2025-08-14

## 2025-08-14 RX ORDER — MORPHINE SULFATE 4 MG/ML
4 INJECTION, SOLUTION INTRAMUSCULAR; INTRAVENOUS ONCE
Status: COMPLETED | OUTPATIENT
Start: 2025-08-14 | End: 2025-08-14

## 2025-08-14 RX ORDER — CEPHALEXIN 500 MG/1
500 CAPSULE ORAL ONCE
Status: COMPLETED | OUTPATIENT
Start: 2025-08-14 | End: 2025-08-14

## 2025-08-14 RX ORDER — HYDROCODONE BITARTRATE AND ACETAMINOPHEN 5; 325 MG/1; MG/1
1 TABLET ORAL EVERY 8 HOURS PRN
Qty: 10 TABLET | Refills: 0 | Status: SHIPPED | OUTPATIENT
Start: 2025-08-14 | End: 2025-08-19

## (undated) DEVICE — THE ECHELON FLEX POWERED PLUS ARTICULATING ENDOSCOPIC LINEAR CUTTERS ARE STERILE, SINGLE PATIENT USE INSTRUMENTS THAT SIMULTANEOUSLYCUT AND STAPLE TISSUE. THERE ARE SIX STAGGERED ROWS OF STAPLES, THREE ON EITHER SIDE OF THE CUT LINE. THE ECHELON FLEX 45 POWERED PLUSINSTRUMENTS HAVE A STAPLE LINE THAT IS APPROXIMATELY 45 MM LONG AND A CUT LINE THAT IS APPROXIMATELY 42 MM LONG. THE SHAFT CAN ROTATE FREELYIN BOTH DIRECTIONS AND AN ARTICULATION MECHANISM ENABLES THE DISTAL PORTION OF THE SHAFT TO PIVOT TO FACILITATE LATERAL ACCESS TO THE OPERATIVESITE.THE INSTRUMENTS ARE PACKAGED WITH A PRIMARY LITHIUM BATTERY PACK THAT MUST BE INSTALLED PRIOR TO USE. THERE ARE SPECIFIC REQUIREMENTS FORDISPOSING OF THE BATTERY PACK. REFER TO THE BATTERY PACK DISPOSAL SECTION.THE INSTRUMENTS ARE PACKAGED WITHOUT A RELOAD AND MUST BE LOADED PRIOR TO USE. A STAPLE RETAINING CAP ON THE RELOAD PROTECTS THE STAPLE LEGPOINTS DURING SHIPPING AND TRANSPORTATION. THE INSTRUMENTS’ LOCK-OUT FEATURE IS DESIGNED TO PREVENT A USED OR IMPROPERLY INSTALLED RELOADFROM BEING REFIRED OR AN INSTRUMENT FROM BEING FIRED WITHOUT A RELOAD.: Brand: ECHELON FLEX

## (undated) DEVICE — DISPOSABLE LAPAROSCOPIC CLIP APPLIER WITH 20 CLIPS.: Brand: EPIX® UNIVERSAL CLIP APPLIER

## (undated) DEVICE — SHEET,DRAPE,40X58,STERILE: Brand: MEDLINE

## (undated) DEVICE — COVER LT HNDL RIG FOR SUR CAM DISP

## (undated) DEVICE — LAP CHOLE/APPY CDS-LF: Brand: MEDLINE INDUSTRIES, INC.

## (undated) DEVICE — GAUZE SPONGES,USP TYPE VII GAUZE, 12 PLY: Brand: CURITY

## (undated) DEVICE — TROCAR: Brand: KII® SLEEVE

## (undated) DEVICE — ENDOPATH ULTRA VERESS INSUFFLATION NEEDLES WITH LUER LOCK CONNECTORS: Brand: ENDOPATH

## (undated) DEVICE — 3M(TM) TEGADERM(TM) TRANSPARENT FILM DRESSING FRAME STYLE 9505W: Brand: 3M™ TEGADERM™

## (undated) DEVICE — Device

## (undated) DEVICE — SLEEVE COMPR MD KNEE LEN SGL USE KENDALL SCD

## (undated) DEVICE — ZIPWIRE GUIDEWIRE .035X150 STR

## (undated) DEVICE — SUTURE MONOCRYL 4-0 PS-2

## (undated) DEVICE — SOL  .9 1000ML BTL

## (undated) DEVICE — THE ECHELON, ECHELON ENDOPATH™ AND ECHELON FLEX™ FAMILIES OF ENDOSCOPIC LINEAR CUTTERS AND RELOADS ARE STERILE, SINGLE PATIENT USE INSTRUMENTS THAT SIMULTANEOUSLY CUT AND STAPLE TISSUE. THERE ARE SIX STAGGERED ROWS OF STAPLES, THREE ON EITHER SIDE OF THE CUT LINE. THE 45 MM INSTRUMENTS HAVE A STAPLE LINE THATIS APPROXIMATELY 45 MM LONG AND A CUT LINE THAT IS APPROXIMATELY 42 MM LONG. THE SHAFT CAN ROTATE FREELY IN BOTH DIRECTIONS AND AN ARTICULATION MECHANISM ON ARTICULATING INSTRUMENTS ENABLES BENDING THE DISTAL PORTIONOF THE SHAFT TO FACILITATE LATERAL ACCESS OF THE OPERATIVE SITE.THE INSTRUMENTS ARE SHIPPED WITHOUT A RELOAD AND MUST BE LOADED PRIOR TO USE. A STAPLE RETAINING CAP ON THE RELOAD PROTECTS THE STAPLE LEG POINTS DURING SHIPPING AND TRANSPORTATION. THE INSTRUMENTS’ LOCK-OUT FEATURE IS DESIGNED TO PREVENT A USED RELOAD FROM BEING REFIRED.: Brand: ECHELON ENDOPATH

## (undated) DEVICE — SUTURE PDS II 1 CT-1

## (undated) DEVICE — 40580 - THE PINK PAD - ADVANCED TRENDELENBURG POSITIONING KIT: Brand: 40580 - THE PINK PAD - ADVANCED TRENDELENBURG POSITIONING KIT

## (undated) DEVICE — GLOVE SUR 7 SENSICARE PI PIP CRM PWD F

## (undated) DEVICE — Device: Brand: SUTURE PASSOR PRO

## (undated) DEVICE — CHLORAPREP 26ML APPLICATOR

## (undated) DEVICE — SOLUTION IRRIG 1000ML 0.9% NACL USP BTL

## (undated) DEVICE — TROCAR: Brand: KII FIOS FIRST ENTRY

## (undated) DEVICE — SUT PDS II 0 L27IN ABSRB VLT L26MM CT-2

## (undated) DEVICE — LAPAROVUE VISIBILITY SYSTEM LAPAROSCOPIC SOLUTIONS: Brand: LAPAROVUE

## (undated) DEVICE — L-HOOK CAUTERY PROBE TIP, DISPOSABLE: Brand: RENEW

## (undated) DEVICE — GLOVE SUR 7.5 SENSICARE PI PIP GRN PWD F

## (undated) DEVICE — TRADITIONAL MARYLAND DISSECTOR TIP, DISPOSABLE: Brand: RENEW

## (undated) DEVICE — SCD SLEEVE KNEE HI BLEND

## (undated) DEVICE — LIGASURE LAP MARYLAND 37CM

## (undated) DEVICE — SUTURE VICRYL 2-0 SH

## (undated) DEVICE — VISUALIZATION SYSTEM: Brand: CLEARIFY

## (undated) DEVICE — ADHESIVE SKIN TOP FOR WND CLSR DERMBND ADV

## (undated) DEVICE — LIGHT HANDLE

## (undated) DEVICE — CLIP APPLIER WITH CLIP LOGIC TECHNOLOGY: Brand: ENDO CLIP III

## (undated) DEVICE — STERILE SYNTHETIC POLYISOPRENE POWDER-FREE SURGICAL GLOVES WITH HYDROGEL COATING: Brand: PROTEXIS

## (undated) DEVICE — MINI ENDOCUT SCISSOR TIP, DISPOSABLE: Brand: RENEW

## (undated) DEVICE — C-ARM: Brand: UNBRANDED

## (undated) DEVICE — TISSUE RETRIEVAL SYSTEM: Brand: INZII RETRIEVAL SYSTEM

## (undated) DEVICE — LAPCLINCH GRASPER TIP, DISPOSABLE: Brand: RENEW

## (undated) DEVICE — POUCH SPECIMEN WIRE 6X3 250ML

## (undated) DEVICE — SUT MCRYL 4-0 18IN PS-2 ABSRB UD 19MM 3/8 CIR

## (undated) DEVICE — CATHETER URET 5FR L70CM FLX OPN TIP NONPORTED

## (undated) DEVICE — TROCAR: Brand: KII SHIELDED BLADED ACCESS SYSTEM

## (undated) NOTE — LETTER
2021    Return to School / Work    Name: Xena Benavides        : 1985    To Whom It May Concern,    Xena Benavides had surgery on 21 and is:    Able to return to work on 21 with restrictions:  Light Duty Until 21.     Comments: She m

## (undated) NOTE — LETTER
Bekah Riojas [address]  [address] [facility]  Authorization for Surgical Operation and Procedure     Date:___________                                                                                                         Time:__________ occur: fever and allergic reactions, hemolytic reactions, transmission of diseases such as Hepatitis, AIDS and Cytomegalovirus (CMV) and fluid overload.   In the event that I wish to have an autologous transfusion of my own blood, or a directed donor transf applicable recovery period ends for purposes of reinstating the DNAR order.   10. Patients having a sterilization procedure: I understand that if the procedure is successful the results will be permanent and it will therefore be impossible for me to insemin medicine and do additional procedures as necessary. Some examples are: Starting or using an “IV” to give me medicine, fluids or blood during my procedure, and having a breathing tube placed to help me breathe when I’m asleep (intubation).  In the event that but potential complications include headache, bleeding, infection, seizure, irregular heart rhythms, and nerve injury.     I can change my mind about having anesthesia services at any time before I get the medicine.    ______________________________________

## (undated) NOTE — LETTER
[address]  [id number]  Authorization for Surgical Operation and Procedure   Date:___________                                                                                                         Time:__________  I hereby authorizeSAlirio loaiza MD, my physician and his/her assistants (if applicable), which may include medical students, residents, and/or fellows, to perform the following surgical operation/ procedure and administer such anesthesia as may be determined necessary by my physician:  Operation/Procedure name (s) Procedure(s):  LAPAROSCOPIC CHOLECYSTECTOMY WITH CHOLANGIOGRAM, POSSIBLE OPEN on Sinaria Brown   2.   I recognize that during the surgical operation/procedure, unforeseen conditions may necessitate additional or different procedures than those listed above.  I, therefore, further authorize and request that the above-named surgeon, assistants, or designees perform such procedures as are, in their judgment, necessary and desirable.    3.   My surgeon/physician has discussed prior to my surgery the potential benefits, risks and side effects of this procedure; the likelihood of achieving goals; and potential problems that might occur during recuperation.  They also discussed reasonable alternatives to the procedure, including risks, benefits, and side effects related to the alternatives and risks related to not receiving this procedure.  I have had all my questions answered and I acknowledge that no guarantee has been made as to the result that may be obtained.    4.   Should the need arise during my operation or immediate post-operative period, I also consent to the administration of blood and/or blood products.  Further, I understand that despite careful testing and screening of blood or blood products by collecting agencies, I may still be  subject to ill effects as a result of receiving a blood transfusion and/or blood products.  The following are some, but not all, of the potential risks that can occur: fever and allergic reactions, hemolytic reactions, transmission of diseases such as Hepatitis, AIDS and Cytomegalovirus (CMV) and fluid overload.  In the event that I wish to have an autologous transfusion of my own blood, or a directed donor transfusion.  I will discuss this with my physician.   5.   I authorize the use of any specimen, organs, tissues, body parts or foreign objects that may be removed from my body during the operation/procedure for diagnosis, research or teaching purposes and their subsequent disposal by hospital authorities.  I also authorize the release of specimen test results and/or written reports to my treating physician on the hospital medical staff or other referring or consulting physicians involved in my care, at the discretion of the Pathologist or my treating physician.    6.   I consent to the photographing or videotaping of the operations or procedures to be performed, including appropriate portions of my body for medical, scientific, or educational purposes, provided my identity is not revealed by the pictures or by descriptive texts accompanying them.  If the procedure has been photographed/videotaped, the surgeon will obtain the original picture, image, videotape or CD.  The hospital will not be responsible for storage, release or maintenance of the picture, image, tape or CD.    7.   I consent to the presence of a  or observers in the operating room as deemed necessary by my physician or their designees.    8.   I recognize that in the event my procedure results in extended X-Ray/fluoroscopy time, I may develop a skin reaction.    9. If I have a Do Not Attempt Resuscitation (DNAR) order in place, that status will be suspended while in the operating room, procedural suite, and during the recovery  period unless otherwise explicitly stated by me (or a person authorized to consent on my behalf). The surgeon or my attending physician will determine when the applicable recovery period ends for purposes of reinstating the DNAR order.  10. Patients having a sterilization procedure: I understand that if the procedure is successful the results will be permanent and it will therefore be impossible for me to inseminate, conceive, or bear children.  I also understand that the procedure is intended to result in sterility, although the result has not been guaranteed.   11. I acknowledge that my physician has explained sedation/analgesia administration to me including the risk and benefits I consent to the administration of sedation/analgesia as may be necessary or desirable in the judgment of my physician.    I CERTIFY THAT I HAVE READ AND FULLY UNDERSTAND THE ABOVE CONSENT TO OPERATION and/or OTHER PROCEDURE.      _________________________________________  __________________________________  Signature of Patient     Signature of Responsible Person         ___________________________________         Printed Name of Responsible Person           _________________________________                 Relationship to Patient  _________________________________________  ______________________________  Signature of Witness          Date  Time    Patient Name: Cain Boone     : 1985                 Printed: 2024     Medical Record #: [id number]                     Page 1 of 1

## (undated) NOTE — LETTER
2024    Return to Physical Therapy    Name: Cain Boone        : 1985    To Whom It May Concern,    Cain Boone had surgery on 2024 and is:    Able to return to physical therapy. She should avoid lifting greater than 15-20 lbs until 2024    The patient may return to physical therapy on 2024    If there are any further questions, regarding this patient's care, please contact the patient directly.    Sincerely,    Arin Guo PA-C

## (undated) NOTE — LETTER
Date & Time: 1/25/2023, 1:37 AM  Patient: Andi Jefferson  Encounter Provider(s): Yanet Li DO       To Whom It May Concern:    Andi Jfeferson was seen and treated in our department on 1/24/2023.  She excused from work for 3 days      If you have any questions or concerns, please do not hesitate to call.        _____________________________  Physician/APC Signature

## (undated) NOTE — LETTER
Bekah Riojas [address]  [address] [facility] [address] [facility]  Authorization for Surgical Operation and Procedure     Date:___________                                                                                                         Time:__________ risks that can occur: fever and allergic reactions, hemolytic reactions, transmission of diseases such as Hepatitis, AIDS and Cytomegalovirus (CMV) and fluid overload.   In the event that I wish to have an autologous transfusion of my own blood, or a direct determine when the applicable recovery period ends for purposes of reinstating the DNAR order.   10. Patients having a sterilization procedure: I understand that if the procedure is successful the results will be permanent and it will therefore be impossibl doctor) to give me medicine and do additional procedures as necessary.  Some examples are: Starting or using an “IV” to give me medicine, fluids or blood during my procedure, and having a breathing tube placed to help me breathe when I’m asleep (intubation) understand that rare but potential complications include headache, bleeding, infection, seizure, irregular heart rhythms, and nerve injury.     I can change my mind about having anesthesia services at any time before I get the medicine.    _________________

## (undated) NOTE — LETTER
Bekah Riojas 89  [address] [facility] [address] [facility]  Authorization for Surgical Operation and Procedure     Date:___________                                                                                                         Time:__________ occur: fever and allergic reactions, hemolytic reactions, transmission of diseases such as Hepatitis, AIDS and Cytomegalovirus (CMV) and fluid overload.   In the event that I wish to have an autologous transfusion of my own blood, or a directed donor transf applicable recovery period ends for purposes of reinstating the DNAR order.   10. Patients having a sterilization procedure: I understand that if the procedure is successful the results will be permanent and it will therefore be impossible for me to insemin medicine and do additional procedures as necessary. Some examples are: Starting or using an “IV” to give me medicine, fluids or blood during my procedure, and having a breathing tube placed to help me breathe when I’m asleep (intubation).  In the event that but potential complications include headache, bleeding, infection, seizure, irregular heart rhythms, and nerve injury.     I can change my mind about having anesthesia services at any time before I get the medicine.    ______________________________________